# Patient Record
Sex: FEMALE | Race: BLACK OR AFRICAN AMERICAN | NOT HISPANIC OR LATINO | Employment: OTHER | URBAN - METROPOLITAN AREA
[De-identification: names, ages, dates, MRNs, and addresses within clinical notes are randomized per-mention and may not be internally consistent; named-entity substitution may affect disease eponyms.]

---

## 2017-03-19 RX ORDER — DIGOXIN 125 UG/1
TABLET ORAL
Qty: 90 TABLET | Refills: 0 | Status: SHIPPED | OUTPATIENT
Start: 2017-03-19 | End: 2017-07-03 | Stop reason: SDUPTHER

## 2017-04-17 ENCOUNTER — OFFICE VISIT (OUTPATIENT)
Dept: FAMILY MEDICINE | Facility: CLINIC | Age: 82
End: 2017-04-17
Payer: MEDICARE

## 2017-04-17 ENCOUNTER — LAB VISIT (OUTPATIENT)
Dept: LAB | Facility: HOSPITAL | Age: 82
End: 2017-04-17
Attending: INTERNAL MEDICINE
Payer: MEDICARE

## 2017-04-17 VITALS
HEART RATE: 96 BPM | WEIGHT: 109.81 LBS | DIASTOLIC BLOOD PRESSURE: 60 MMHG | BODY MASS INDEX: 22.14 KG/M2 | SYSTOLIC BLOOD PRESSURE: 112 MMHG | HEIGHT: 59 IN | OXYGEN SATURATION: 98 % | RESPIRATION RATE: 18 BRPM | TEMPERATURE: 98 F

## 2017-04-17 DIAGNOSIS — I50.32 CHRONIC DIASTOLIC (CONGESTIVE) HEART FAILURE: Chronic | ICD-10-CM

## 2017-04-17 DIAGNOSIS — I49.5 SINOATRIAL NODE DYSFUNCTION: ICD-10-CM

## 2017-04-17 DIAGNOSIS — Z00.00 ANNUAL PHYSICAL EXAM: Primary | ICD-10-CM

## 2017-04-17 DIAGNOSIS — K21.9 GASTROESOPHAGEAL REFLUX DISEASE, ESOPHAGITIS PRESENCE NOT SPECIFIED: ICD-10-CM

## 2017-04-17 DIAGNOSIS — Z00.00 ANNUAL PHYSICAL EXAM: ICD-10-CM

## 2017-04-17 LAB
ALBUMIN SERPL BCP-MCNC: 3.3 G/DL
ALP SERPL-CCNC: 47 U/L
ALT SERPL W/O P-5'-P-CCNC: 5 U/L
ANION GAP SERPL CALC-SCNC: 6 MMOL/L
AST SERPL-CCNC: 16 U/L
BASOPHILS # BLD AUTO: 0.05 K/UL
BASOPHILS NFR BLD: 0.8 %
BILIRUB SERPL-MCNC: 0.2 MG/DL
BUN SERPL-MCNC: 39 MG/DL
CALCIUM SERPL-MCNC: 9.6 MG/DL
CHLORIDE SERPL-SCNC: 105 MMOL/L
CHOLEST/HDLC SERPL: 3.8 {RATIO}
CO2 SERPL-SCNC: 29 MMOL/L
CREAT SERPL-MCNC: 1.6 MG/DL
DIFFERENTIAL METHOD: ABNORMAL
EOSINOPHIL # BLD AUTO: 0.3 K/UL
EOSINOPHIL NFR BLD: 4.9 %
ERYTHROCYTE [DISTWIDTH] IN BLOOD BY AUTOMATED COUNT: 15.6 %
EST. GFR  (AFRICAN AMERICAN): 30 ML/MIN/1.73 M^2
EST. GFR  (NON AFRICAN AMERICAN): 26 ML/MIN/1.73 M^2
GLUCOSE SERPL-MCNC: 79 MG/DL
HCT VFR BLD AUTO: 35 %
HDL/CHOLESTEROL RATIO: 26.4 %
HDLC SERPL-MCNC: 148 MG/DL
HDLC SERPL-MCNC: 39 MG/DL
HGB BLD-MCNC: 10.7 G/DL
LDLC SERPL CALC-MCNC: 84.4 MG/DL
LYMPHOCYTES # BLD AUTO: 1.7 K/UL
LYMPHOCYTES NFR BLD: 26.5 %
MCH RBC QN AUTO: 25.7 PG
MCHC RBC AUTO-ENTMCNC: 30.6 %
MCV RBC AUTO: 84 FL
MONOCYTES # BLD AUTO: 0.7 K/UL
MONOCYTES NFR BLD: 10.1 %
NEUTROPHILS # BLD AUTO: 3.7 K/UL
NEUTROPHILS NFR BLD: 57.2 %
NONHDLC SERPL-MCNC: 109 MG/DL
PLATELET # BLD AUTO: 233 K/UL
PMV BLD AUTO: 9.7 FL
POTASSIUM SERPL-SCNC: 5.8 MMOL/L
PROT SERPL-MCNC: 7.5 G/DL
RBC # BLD AUTO: 4.17 M/UL
SODIUM SERPL-SCNC: 140 MMOL/L
TRIGL SERPL-MCNC: 123 MG/DL
TSH SERPL DL<=0.005 MIU/L-ACNC: 2.96 UIU/ML
WBC # BLD AUTO: 6.53 K/UL

## 2017-04-17 PROCEDURE — 85025 COMPLETE CBC W/AUTO DIFF WBC: CPT

## 2017-04-17 PROCEDURE — 99999 PR PBB SHADOW E&M-EST. PATIENT-LVL III: CPT | Mod: PBBFAC,,, | Performed by: INTERNAL MEDICINE

## 2017-04-17 PROCEDURE — 84443 ASSAY THYROID STIM HORMONE: CPT

## 2017-04-17 PROCEDURE — 80053 COMPREHEN METABOLIC PANEL: CPT

## 2017-04-17 PROCEDURE — 99499 UNLISTED E&M SERVICE: CPT | Mod: S$GLB,,, | Performed by: INTERNAL MEDICINE

## 2017-04-17 PROCEDURE — 99397 PER PM REEVAL EST PAT 65+ YR: CPT | Mod: S$GLB,,, | Performed by: INTERNAL MEDICINE

## 2017-04-17 PROCEDURE — 80061 LIPID PANEL: CPT

## 2017-04-17 PROCEDURE — 36415 COLL VENOUS BLD VENIPUNCTURE: CPT

## 2017-04-17 RX ORDER — PANTOPRAZOLE SODIUM 40 MG/1
40 TABLET, DELAYED RELEASE ORAL DAILY
Qty: 90 TABLET | Refills: 1 | Status: SHIPPED | OUTPATIENT
Start: 2017-04-17 | End: 2017-09-21 | Stop reason: SDUPTHER

## 2017-04-17 NOTE — PROGRESS NOTES
SUBJECTIVE     No chief complaint on file.      HPI  Tish Rueda is a 89 y.o. female with multiple medical diagnoses as listed in the medical history and problem list that presents for annual exam. Pt says she has been feeling fine. She was brought in by her daughter today just for a routine check up. Per pt's daughter, she has a good appetite but eats very small portions. She has lots of Ensure meal supplements. Pt denies any difficulty sleeping. Per pt's daughter, she still lives alone. She ambulates with a rolling walker. She does not fall frequently. Pt takes her meds daily and takes a daily MVI.     PAST MEDICAL HISTORY:  Past Medical History:   Diagnosis Date    CHF (congestive heart failure)     Dr. Rivers    Diverticulosis     GERD (gastroesophageal reflux disease)     Hypertension     Pacemaker     Peptic ulcer disease        PAST SURGICAL HISTORY:  Past Surgical History:   Procedure Laterality Date    APPENDECTOMY      CARDIAC PACEMAKER PLACEMENT      CHOLECYSTECTOMY      EYE SURGERY      cataract    HYSTERECTOMY      salpingoopherectomy         SOCIAL HISTORY:  Social History     Social History    Marital status:      Spouse name: N/A    Number of children: N/A    Years of education: N/A     Occupational History    Not on file.     Social History Main Topics    Smoking status: Never Smoker    Smokeless tobacco: Not on file    Alcohol use No    Drug use: No    Sexual activity: No     Other Topics Concern    Not on file     Social History Narrative       FAMILY HISTORY:  History reviewed. No pertinent family history.    ALLERGIES AND MEDICATIONS: updated and reviewed.  Review of patient's allergies indicates:  No Known Allergies  Current Outpatient Prescriptions   Medication Sig Dispense Refill    acyclovir 5% (ZOVIRAX) 5 % ointment Apply topically every 3 (three) hours. 30 g 1    DIGOX 125 mcg tablet TAKE 1 TABLET BY MOUTH EVERY DAY 90 tablet 0    meclizine (ANTIVERT)  "12.5 mg tablet Take 1 tablet (12.5 mg total) by mouth 2 (two) times daily as needed. 30 tablet 5    pantoprazole (PROTONIX) 40 MG tablet Take 1 tablet (40 mg total) by mouth once daily. 90 tablet 1    pediatric multivitamin chewable tablet Take 1 tablet by mouth once daily.       No current facility-administered medications for this visit.        ROS  Review of Systems   Constitutional: Negative for chills and fever.   HENT: Negative for hearing loss and sore throat.    Eyes: Negative for visual disturbance.   Respiratory: Negative for cough and shortness of breath.    Cardiovascular: Negative for chest pain, palpitations and leg swelling.   Gastrointestinal: Negative for abdominal pain, constipation, diarrhea, nausea and vomiting.   Genitourinary: Negative for dysuria, frequency and urgency.   Musculoskeletal: Negative for arthralgias, joint swelling and myalgias.   Skin: Negative for rash and wound.   Neurological: Positive for headaches.   Psychiatric/Behavioral: Negative for agitation and confusion. The patient is not nervous/anxious.          OBJECTIVE     Physical Exam  Vitals:    04/17/17 0957   BP: 112/60   Pulse: 96   Resp: 18   Temp: 98.3 °F (36.8 °C)    Body mass index is 22.17 kg/(m^2).  Weight: 49.8 kg (109 lb 12.6 oz)   Height: 4' 11" (149.9 cm)     Physical Exam   Constitutional: She is oriented to person, place, and time. She appears well-developed and well-nourished. No distress.   HENT:   Head: Normocephalic and atraumatic.   Right Ear: External ear normal.   Left Ear: External ear normal.   Nose: Nose normal.   Mouth/Throat: Oropharynx is clear and moist.   Eyes: Conjunctivae and EOM are normal. Right eye exhibits no discharge. Left eye exhibits no discharge. No scleral icterus.   Neck: Normal range of motion. Neck supple. No JVD present. No tracheal deviation present.   Cardiovascular: Normal rate, regular rhythm, normal heart sounds and intact distal pulses.  Exam reveals no gallop and no " friction rub.    No murmur heard.  Pulmonary/Chest: Effort normal and breath sounds normal. No respiratory distress. She has no wheezes.   Abdominal: Soft. Bowel sounds are normal. She exhibits no distension and no mass. There is no tenderness. There is no rebound and no guarding.   Musculoskeletal: Normal range of motion. She exhibits no edema, tenderness or deformity.   Neurological: She is alert and oriented to person, place, and time. She exhibits normal muscle tone. Coordination normal.   Skin: Skin is warm and dry. No rash noted. No erythema.   Psychiatric: She has a normal mood and affect. Her behavior is normal. Judgment and thought content normal.         Health Maintenance       Date Due Completion Date    TETANUS VACCINE 7/7/1931 ---    DEXA SCAN 7/7/1953 ---    Zoster Vaccine 7/7/1973 ---    Pneumococcal (65+) (1 of 2 - PCV13) 7/7/1978 ---    Influenza Vaccine 8/1/2016 ---    Lipid Panel 3/24/2021 3/24/2016            ASSESSMENT     89 y.o. female with     1. Annual physical exam    2. Sinoatrial node dysfunction    3. Chronic diastolic (congestive) heart failure    4. Gastroesophageal reflux disease, esophagitis presence not specified        PLAN:     1. Annual physical exam  - Counseled on age appropriate medical preventative services, including age appropriate cancer screenings, over all nutritional health, need for a consistent exercise regimen and an over all push towards maintaining a vigorous and active lifestyle.  Counseled on age appropriate vaccines and discussed upcoming health care needs based on age/gender.  Spent time with patient counseling on need for a good patient/doctor relationship moving forward.  Discussed use of common OTC medications and supplements.  Discussed common dietary aids and use of caffeine and the need for good sleep hygiene and stress management.  - CBC auto differential; Future  - Comprehensive metabolic panel; Future  - Lipid panel; Future  - TSH; Future    2.  Sinoatrial node dysfunction  - Stable; no acute issues  - Pacemaker in place  - Ambulatory referral to Cardiology    3. Chronic diastolic (congestive) heart failure  - Stable; no acute issues  - Continue good BP control  - Ambulatory referral to Cardiology    4. Gastroesophageal reflux disease, esophagitis presence not specified  - Stable; no acute issues  - The current medical regimen is effective;  continue present plan and medications.  - pantoprazole (PROTONIX) 40 MG tablet; Take 1 tablet (40 mg total) by mouth once daily.  Dispense: 90 tablet; Refill: 1      RTC in 3 months for f/u with PCP-Dr. Johann Flores MD  04/17/2017 10:07 AM        No Follow-up on file.            +

## 2017-05-01 ENCOUNTER — OFFICE VISIT (OUTPATIENT)
Dept: CARDIOLOGY | Facility: CLINIC | Age: 82
End: 2017-05-01
Payer: MEDICARE

## 2017-05-01 ENCOUNTER — TELEPHONE (OUTPATIENT)
Dept: FAMILY MEDICINE | Facility: CLINIC | Age: 82
End: 2017-05-01

## 2017-05-01 VITALS
SYSTOLIC BLOOD PRESSURE: 129 MMHG | HEART RATE: 69 BPM | DIASTOLIC BLOOD PRESSURE: 60 MMHG | HEIGHT: 59 IN | BODY MASS INDEX: 21.17 KG/M2 | WEIGHT: 105 LBS | OXYGEN SATURATION: 96 %

## 2017-05-01 DIAGNOSIS — Z95.0 PACEMAKER: ICD-10-CM

## 2017-05-01 DIAGNOSIS — I50.32 CHRONIC DIASTOLIC (CONGESTIVE) HEART FAILURE: ICD-10-CM

## 2017-05-01 DIAGNOSIS — N18.30 CHRONIC KIDNEY DISEASE (CKD), STAGE III (MODERATE): ICD-10-CM

## 2017-05-01 DIAGNOSIS — I49.5 SINOATRIAL NODE DYSFUNCTION: Primary | ICD-10-CM

## 2017-05-01 DIAGNOSIS — I10 ESSENTIAL HYPERTENSION, BENIGN: ICD-10-CM

## 2017-05-01 DIAGNOSIS — D63.8 ANEMIA OF CHRONIC DISORDER: ICD-10-CM

## 2017-05-01 PROCEDURE — 99499 UNLISTED E&M SERVICE: CPT | Mod: S$GLB,,, | Performed by: INTERNAL MEDICINE

## 2017-05-01 PROCEDURE — 99204 OFFICE O/P NEW MOD 45 MIN: CPT | Mod: S$GLB,,, | Performed by: INTERNAL MEDICINE

## 2017-05-01 PROCEDURE — 1159F MED LIST DOCD IN RCRD: CPT | Mod: S$GLB,,, | Performed by: INTERNAL MEDICINE

## 2017-05-01 PROCEDURE — 99999 PR PBB SHADOW E&M-EST. PATIENT-LVL III: CPT | Mod: PBBFAC,,, | Performed by: INTERNAL MEDICINE

## 2017-05-01 PROCEDURE — 1160F RVW MEDS BY RX/DR IN RCRD: CPT | Mod: S$GLB,,, | Performed by: INTERNAL MEDICINE

## 2017-05-01 PROCEDURE — 1126F AMNT PAIN NOTED NONE PRSNT: CPT | Mod: S$GLB,,, | Performed by: INTERNAL MEDICINE

## 2017-05-01 NOTE — TELEPHONE ENCOUNTER
----- Message from Sendy Paz sent at 5/1/2017  8:35 AM CDT -----  Contact: daughter-molly Jim is requesting a script for a dry cough. Please advise. [phone]

## 2017-05-01 NOTE — PROGRESS NOTES
Subjective:    Patient ID:  Tish Rueda is a 89 y.o. female who presents for evaluation of Establish Care      HPI  Patient is here to establish care for sinus node dysfunction previous pacemaker placement.  She previously was followed by [facility] and the heart clinic.  She's changing due to insurance purposes.  She has previous history of diastolic dysfunction and sinus node dysfunction with previous pacemaker placement.  She had her pacemaker changed in 2014.  She denies any cardiopulmonary complaints.  She somewhat of a poor historian.  She's not expressing any PND, orthopnea or lower edema.  She is wearing compression stockings.  She denies any dizziness to the point of presyncope or syncope.  She does go out of the house several times a week.  Mainly she goes shopping.  She does do some light laundry at the house.    Review of Systems   Constitution: Negative.   HENT: Negative.    Eyes: Negative.    Cardiovascular: Negative for chest pain, dyspnea on exertion, irregular heartbeat, leg swelling, near-syncope, orthopnea, palpitations, paroxysmal nocturnal dyspnea and syncope.   Respiratory: Negative for shortness of breath.    Skin: Negative.    Musculoskeletal: Negative.    Gastrointestinal: Negative for abdominal pain, constipation and diarrhea.   Genitourinary: Negative for dysuria.   Neurological: Negative.    Psychiatric/Behavioral: Negative.      Past Medical History:   Diagnosis Date    CHF (congestive heart failure)     Dr. Rivers    Diverticulosis     GERD (gastroesophageal reflux disease)     Hypertension     Pacemaker     Peptic ulcer disease      Past Surgical History:   Procedure Laterality Date    APPENDECTOMY      CARDIAC PACEMAKER PLACEMENT      CHOLECYSTECTOMY      EYE SURGERY      cataract    HYSTERECTOMY      salpingoopherectomy       Social History   Substance Use Topics    Smoking status: Never Smoker    Smokeless tobacco: None    Alcohol use No   History reviewed. No  pertinent family history.     Objective:    Physical Exam   Constitutional: She is oriented to person, place, and time. She appears well-developed and well-nourished.   HENT:   Head: Normocephalic and atraumatic.   Eyes: Conjunctivae and EOM are normal. Pupils are equal, round, and reactive to light.   Neck: Normal range of motion. Neck supple. No thyromegaly present.   Cardiovascular: Normal rate and regular rhythm.    No murmur heard.  Pulmonary/Chest: Effort normal and breath sounds normal. No respiratory distress.   Abdominal: Soft. Bowel sounds are normal.   Musculoskeletal: She exhibits no edema.   Neurological: She is alert and oriented to person, place, and time.   Skin: Skin is warm and dry.   Psychiatric: She has a normal mood and affect. Her behavior is normal.       ekg AV paced rhythm    Assessment:       1. Sinoatrial node dysfunction    2. Pacemaker    3. Essential hypertension, benign    4. Chronic kidney disease (CKD), stage III (moderate)    5. Anemia of chronic disorder    6. Chronic diastolic (congestive) heart failure         Plan:       -Continue current therapy  -Check baseline echocardiogram  -Routine surveillance Metronic checks    Return to clinic at next Medtronic check

## 2017-05-01 NOTE — MR AVS SNAPSHOT
[facility]  [address] [place] [address]  Phone: [phone]                  Tish Rueda   2017 10:00 AM   Office Visit    Description:  Female : 1913   Provider:  Andres Ray MD   Department:  [facility]           Reason for Visit     Establish Care           Diagnoses this Visit        Comments    Sinoatrial node dysfunction    -  Primary     Pacemaker         Essential hypertension, benign         Chronic kidney disease (CKD), stage III (moderate)         Anemia of chronic disorder         Chronic diastolic (congestive) heart failure                To Do List           Future Appointments        Provider Department Dept Phone    2017 10:00 AM Andres Ray MD [facility] [phone]      Goals (5 Years of Data)     None      [facility]     [facility]  Assistance  Unless otherwise directed by your provider, please contact [facility], our nurse care line that is available for  assistance.     Registered nurses in the [facility] provide: appointment scheduling, clinical advisement, health education, and other advisory services.  Call: [phone] (toll free)               Medications           Message regarding Medications     Verify the changes and/or additions to your medication regime listed below are the same as discussed with your clinician today.  If any of these changes or additions are incorrect, please notify your healthcare provider.        STOP taking these medications     acyclovir 5% (ZOVIRAX) 5 % ointment Apply topically every 3 (three) hours.           Verify that the below list of medications is an accurate representation of the medications you are currently taking.  If none reported, the list may be blank. If incorrect, please contact your healthcare provider. Carry this list with you in case of emergency.           Current Medications     DIGOX 125 mcg tablet  "TAKE 1 TABLET BY MOUTH EVERY DAY    meclizine (ANTIVERT) 12.5 mg tablet Take 1 tablet (12.5 mg total) by mouth 2 (two) times daily as needed.    pantoprazole (PROTONIX) 40 MG tablet Take 1 tablet (40 mg total) by mouth once daily.    pediatric multivitamin chewable tablet Take 1 tablet by mouth once daily.           Clinical Reference Information           Your Vitals Were     BP Pulse Height Weight SpO2 BMI    129/60 (BP Location: Left arm, Patient Position: Sitting, BP Method: Automatic) 69 4' 11" (1.499 m) 47.6 kg (105 lb) 96% 21.21 kg/m2      Blood Pressure          Most Recent Value    BP  129/60      Allergies as of 5/1/2017     No Known Allergies      Immunizations Administered on Date of Encounter - 5/1/2017     None      [facility] Sign-Up     Activating your [facility] account is as easy as 1-2-3!     1) Visit [email or web address], select Sign Up Now, enter this activation code and your date of birth, then select Next.  [email or web address]  Expires: 6/15/2017  9:37 AM      2) Create a username and password to use when you visit [facility] in the future and select a security question in case you lose your password and select Next.    3) Enter your e-mail address and click Sign Up!    Additional Information  If you have questions, please e-mail [email or web address] or call [phone] to talk to our [facility] staff. Remember, [facility] is NOT to be used for urgent needs. For medical emergencies, dial 911.         Language Assistance Services     ATTENTION: Language assistance services are available, free of charge. Please call [phone].      ATENCIÓN: Si habla español, tiene a yoder disposición servicios gratuitos de asistencia lingüística. Llame al [phone].     CHÚ Ý: N?u b?n nói Ti?ng Vi?t, có các d?ch v? h? tr? ngôn ng? mi?n phí dành cho b?n. G?i s? [phone].         [facility] complies with applicable Federal civil rights laws and does not discriminate on the basis of race, color, " national origin, age, disability, or sex.

## 2017-05-15 ENCOUNTER — HOSPITAL ENCOUNTER (OUTPATIENT)
Dept: CARDIOLOGY | Facility: HOSPITAL | Age: 82
Discharge: HOME | End: 2017-05-15
Attending: INTERNAL MEDICINE
Payer: MEDICARE

## 2017-05-15 DIAGNOSIS — I49.5 SINOATRIAL NODE DYSFUNCTION: ICD-10-CM

## 2017-05-15 LAB
AORTIC VALVE REGURGITATION: NORMAL
DIASTOLIC DYSFUNCTION: NO
ESTIMATED PA SYSTOLIC PRESSURE: 27.6
MITRAL VALVE REGURGITATION: NORMAL
RETIRED EF AND QEF - SEE NOTES: 55 (ref 55–65)
TRICUSPID VALVE REGURGITATION: NORMAL

## 2017-05-15 PROCEDURE — 93306 TTE W/DOPPLER COMPLETE: CPT

## 2017-05-15 PROCEDURE — 93306 TTE W/DOPPLER COMPLETE: CPT | Mod: 26,,, | Performed by: INTERNAL MEDICINE

## 2017-07-03 RX ORDER — DIGOXIN 125 UG/1
TABLET ORAL
Qty: 90 TABLET | Refills: 1 | Status: SHIPPED | OUTPATIENT
Start: 2017-07-03 | End: 2018-01-22 | Stop reason: SDUPTHER

## 2017-07-11 ENCOUNTER — OFFICE VISIT (OUTPATIENT)
Dept: CARDIOLOGY | Facility: CLINIC | Age: 82
End: 2017-07-11
Payer: MEDICARE

## 2017-07-11 VITALS
HEART RATE: 77 BPM | WEIGHT: 105 LBS | SYSTOLIC BLOOD PRESSURE: 103 MMHG | HEIGHT: 59 IN | DIASTOLIC BLOOD PRESSURE: 55 MMHG | BODY MASS INDEX: 21.17 KG/M2 | OXYGEN SATURATION: 97 %

## 2017-07-11 DIAGNOSIS — N18.30 CHRONIC KIDNEY DISEASE (CKD), STAGE III (MODERATE): ICD-10-CM

## 2017-07-11 DIAGNOSIS — Z95.0 PACEMAKER: ICD-10-CM

## 2017-07-11 DIAGNOSIS — I50.32 CHRONIC DIASTOLIC (CONGESTIVE) HEART FAILURE: ICD-10-CM

## 2017-07-11 DIAGNOSIS — I49.5 SINOATRIAL NODE DYSFUNCTION: Primary | ICD-10-CM

## 2017-07-11 DIAGNOSIS — I10 ESSENTIAL HYPERTENSION, BENIGN: ICD-10-CM

## 2017-07-11 DIAGNOSIS — D63.8 ANEMIA OF CHRONIC DISORDER: ICD-10-CM

## 2017-07-11 PROCEDURE — 1125F AMNT PAIN NOTED PAIN PRSNT: CPT | Mod: S$GLB,,, | Performed by: INTERNAL MEDICINE

## 2017-07-11 PROCEDURE — 99999 PR PBB SHADOW E&M-EST. PATIENT-LVL III: CPT | Mod: PBBFAC,,, | Performed by: INTERNAL MEDICINE

## 2017-07-11 PROCEDURE — 93288 INTERROG EVL PM/LDLS PM IP: CPT | Mod: S$GLB,,, | Performed by: INTERNAL MEDICINE

## 2017-07-11 PROCEDURE — 99499 UNLISTED E&M SERVICE: CPT | Mod: S$GLB,,, | Performed by: INTERNAL MEDICINE

## 2017-07-11 PROCEDURE — 1159F MED LIST DOCD IN RCRD: CPT | Mod: S$GLB,,, | Performed by: INTERNAL MEDICINE

## 2017-07-11 PROCEDURE — 99214 OFFICE O/P EST MOD 30 MIN: CPT | Mod: S$GLB,,, | Performed by: INTERNAL MEDICINE

## 2017-07-11 NOTE — PROGRESS NOTES
Subjective:    Patient ID:  Tish Rueda is a 89 y.o. female who presents for evaluation of No chief complaint on file.      HPI   previous history:  Patient is here to establish care for sinus node dysfunction previous pacemaker placement.  She previously was followed by [facility] and the heart clinic.  She's changing due to insurance purposes.  She has previous history of diastolic dysfunction and sinus node dysfunction with previous pacemaker placement.  She had her pacemaker changed in 2014.  She denies any cardiopulmonary complaints.  She somewhat of a poor historian.  She's not expressing any PND, orthopnea or lower edema.  She is wearing compression stockings.  She denies any dizziness to the point of presyncope or syncope.  She does go out of the house several times a week.  Mainly she goes shopping.  She does do some light laundry at the house.    Today:  Here for follow-up of sinus node dysfunction and pacemaker placement.  She denies any worsening cardiopulmonary complaints.  She does get occasional lower extremity swelling but relieved with elevation.  She doesn't have any compression stockings.  She denies any chest pain, shortness breath or palpitations.  She is limited in her activity.  She does use a seated walker.  She denies any PND or orthopnea.  She does have some dizziness occasionally when getting up too fast but not to the point of presyncope or syncope.        Review of Systems   Constitution: Negative.   HENT: Negative.    Eyes: Negative.    Cardiovascular: Positive for leg swelling. Negative for chest pain, dyspnea on exertion, irregular heartbeat, near-syncope, orthopnea, palpitations, paroxysmal nocturnal dyspnea and syncope.   Respiratory: Negative for shortness of breath.    Skin: Negative.    Musculoskeletal: Negative.    Gastrointestinal: Negative for abdominal pain, constipation and diarrhea.   Genitourinary: Negative for dysuria.   Neurological: Negative.    Psychiatric/Behavioral:  Negative.         Objective:    Physical Exam   Constitutional: She is oriented to person, place, and time. She appears well-developed and well-nourished.   HENT:   Head: Normocephalic and atraumatic.   Eyes: Conjunctivae and EOM are normal. Pupils are equal, round, and reactive to light.   Neck: Normal range of motion. Neck supple. No thyromegaly present.   Cardiovascular: Normal rate and regular rhythm.    No murmur heard.  Pulmonary/Chest: Effort normal and breath sounds normal. No respiratory distress.   Abdominal: Soft. Bowel sounds are normal.   Musculoskeletal: She exhibits no edema.   Neurological: She is alert and oriented to person, place, and time.   Skin: Skin is warm and dry.   Psychiatric: She has a normal mood and affect. Her behavior is normal.        Echo:  CONCLUSIONS     1 - Normal left ventricular systolic function (EF 55-60%).     2 - Concentric hypertrophy.     3 - Left atrial enlargement.     4 - Mild aortic regurgitation.     5 - Trivial mitral regurgitation.     6 - Mild tricuspid regurgitation.     7 - Trivial pulmonic regurgitation.     Assessment:       1. Sinoatrial node dysfunction    2. Pacemaker    3. Essential hypertension, benign    4. Chronic kidney disease (CKD), stage III (moderate)    5. Anemia of chronic disorder    6. Chronic diastolic (congestive) heart failure         Plan:       -Continue current therapy  -Routine surveillance Metronic checks  -Compression stockings for swelling    Return to clinic in 6 mos          Medtronic pacemaker check:    Indication sinus node dysfunction    Advise a DR  Mode AAIR/DDDR lower rate of 60  Underlying rhythm is sinus bradycardia    A paced 77%, ventricular paced 29%    A: 1 mV, 3 of 42 ohms, 1.875 V at 0.4 ms  RV 6.4 mV, 309 9 ohms, 1 V at 0.4 ms    1 ventricular high rate event of proximally a beats which was asymptomatic  No changes    Return to clinic in 6 months

## 2017-09-21 ENCOUNTER — OFFICE VISIT (OUTPATIENT)
Dept: FAMILY MEDICINE | Facility: CLINIC | Age: 82
End: 2017-09-21
Payer: MEDICARE

## 2017-09-21 VITALS
HEART RATE: 68 BPM | SYSTOLIC BLOOD PRESSURE: 116 MMHG | BODY MASS INDEX: 22.66 KG/M2 | OXYGEN SATURATION: 99 % | DIASTOLIC BLOOD PRESSURE: 70 MMHG | TEMPERATURE: 98 F | WEIGHT: 112.19 LBS

## 2017-09-21 DIAGNOSIS — I50.32 CHRONIC DIASTOLIC HF (HEART FAILURE): ICD-10-CM

## 2017-09-21 DIAGNOSIS — K21.9 GASTROESOPHAGEAL REFLUX DISEASE, ESOPHAGITIS PRESENCE NOT SPECIFIED: ICD-10-CM

## 2017-09-21 DIAGNOSIS — M00.9 PYOGENIC ARTHRITIS OF RIGHT KNEE JOINT, DUE TO UNSPECIFIED ORGANISM: Primary | ICD-10-CM

## 2017-09-21 DIAGNOSIS — Z87.898 HISTORY OF DIZZINESS: ICD-10-CM

## 2017-09-21 PROCEDURE — 3008F BODY MASS INDEX DOCD: CPT | Mod: S$GLB,,, | Performed by: FAMILY MEDICINE

## 2017-09-21 PROCEDURE — 99214 OFFICE O/P EST MOD 30 MIN: CPT | Mod: 25,S$GLB,, | Performed by: FAMILY MEDICINE

## 2017-09-21 PROCEDURE — 1125F AMNT PAIN NOTED PAIN PRSNT: CPT | Mod: S$GLB,,, | Performed by: FAMILY MEDICINE

## 2017-09-21 PROCEDURE — 1159F MED LIST DOCD IN RCRD: CPT | Mod: S$GLB,,, | Performed by: FAMILY MEDICINE

## 2017-09-21 PROCEDURE — 96372 THER/PROPH/DIAG INJ SC/IM: CPT | Mod: S$GLB,,, | Performed by: FAMILY MEDICINE

## 2017-09-21 PROCEDURE — 99499 UNLISTED E&M SERVICE: CPT | Mod: S$GLB,,, | Performed by: FAMILY MEDICINE

## 2017-09-21 PROCEDURE — 99999 PR PBB SHADOW E&M-EST. PATIENT-LVL III: CPT | Mod: PBBFAC,,, | Performed by: FAMILY MEDICINE

## 2017-09-21 RX ORDER — PANTOPRAZOLE SODIUM 40 MG/1
40 TABLET, DELAYED RELEASE ORAL DAILY
Qty: 90 TABLET | Refills: 1 | Status: SHIPPED | OUTPATIENT
Start: 2017-09-21 | End: 2018-01-01 | Stop reason: SDUPTHER

## 2017-09-21 RX ORDER — CLINDAMYCIN HYDROCHLORIDE 300 MG/1
300 CAPSULE ORAL 3 TIMES DAILY
Qty: 21 CAPSULE | Refills: 0 | Status: SHIPPED | OUTPATIENT
Start: 2017-09-21 | End: 2017-09-28

## 2017-09-21 RX ORDER — MECLIZINE HCL 12.5 MG 12.5 MG/1
12.5 TABLET ORAL 2 TIMES DAILY PRN
Qty: 30 TABLET | Refills: 5 | Status: SHIPPED | OUTPATIENT
Start: 2017-09-21

## 2017-09-21 RX ORDER — CEFTRIAXONE 500 MG/1
500 INJECTION, POWDER, FOR SOLUTION INTRAMUSCULAR; INTRAVENOUS
Status: COMPLETED | OUTPATIENT
Start: 2017-09-21 | End: 2017-09-21

## 2017-09-21 RX ADMIN — CEFTRIAXONE 500 MG: 500 INJECTION, POWDER, FOR SOLUTION INTRAMUSCULAR; INTRAVENOUS at 10:09

## 2017-09-21 NOTE — PROGRESS NOTES
Subjective:       Patient ID: Tish Rueda is a 89 y.o. female.    Chief Complaint: Right Knee Pain and Right Side Pain    Patient presents for right knee pain. She has redness and swelling. She has no trauma to the knee.  She denies any known insect bites. She has no swelling.       Review of Systems   Constitutional: Negative for chills, diaphoresis and fever.   Skin: Positive for color change. Negative for rash.       Objective:       Vitals:    09/21/17 0958   BP: 116/70   Pulse: 68   Temp: 98 °F (36.7 °C)   TempSrc: Oral   SpO2: 99%   Weight: 50.9 kg (112 lb 3.4 oz)       Physical Exam   Constitutional: She is oriented to person, place, and time. She appears well-developed and well-nourished.   Cardiovascular: Normal rate, regular rhythm and normal heart sounds.  Exam reveals no gallop and no friction rub.    No murmur heard.  Musculoskeletal: She exhibits no edema.        Right knee: She exhibits erythema. She exhibits normal range of motion and no swelling.        Legs:  Neurological: She is alert and oriented to person, place, and time.   Skin: She is not diaphoretic.       Assessment:       1. Pyogenic arthritis of right knee joint, due to unspecified organism    2. History of dizziness    3. Gastroesophageal reflux disease, esophagitis presence not specified    4. Chronic diastolic HF (heart failure)        Plan:       Tish was seen today for right knee pain and right side pain.    Diagnoses and all orders for this visit:    Pyogenic arthritis of right knee joint, due to unspecified organism  -     clindamycin (CLEOCIN) 300 MG capsule; Take 1 capsule (300 mg total) by mouth 3 (three) times daily.  -     CBC auto differential; Future  -     Sedimentation rate, manual; Future  -     C-reactive protein; Future  -     WHEELCHAIR FOR HOME USE  -     cefTRIAXone injection 500 mg; Inject 0.5 g (500 mg total) into the muscle one time.  Starting her on clindamycin and given a rocephin 500mg Im.     History  of dizziness  -     meclizine (ANTIVERT) 12.5 mg tablet; Take 1 tablet (12.5 mg total) by mouth 2 (two) times daily as needed.  Stable. Refilled meds.     Gastroesophageal reflux disease, esophagitis presence not specified  -     pantoprazole (PROTONIX) 40 MG tablet; Take 1 tablet (40 mg total) by mouth once daily.  Stable. Refilled meds.     Chronic diastolic HF (heart failure)  -     COMPREHENSIVE METABOLIC PANEL; Future  -     CBC auto differential; Future

## 2017-09-21 NOTE — PROGRESS NOTES
Pt was given Rocephin 500 mg IM in her right gluteus joshua. Tolerated well. Instructed to remain in the clinic for 15 mins after admin for observation.

## 2017-09-26 ENCOUNTER — TELEPHONE (OUTPATIENT)
Dept: FAMILY MEDICINE | Facility: CLINIC | Age: 82
End: 2017-09-26

## 2017-09-26 NOTE — TELEPHONE ENCOUNTER
Spoke to [facility], states that patient has orders for a wheel chair with a gel pad and a lap belt buckle.  They would like to know if patient has any types of ulcers or anything because this might be denied.  If this is not necessary they will need to cancel the order for the gel pad and buckle.  Please call back with a verbal.

## 2017-09-26 NOTE — TELEPHONE ENCOUNTER
Spoke to edin, she need order for standard wheel chair cushion. Order faxed to [facility]

## 2017-09-26 NOTE — TELEPHONE ENCOUNTER
----- Message from Whitley Goodrich sent at 9/26/2017  2:55 PM CDT -----  Contact: [facility] [phone]  [facility] is requesting a call back in regards to the pt Please call at your earliest convenience.  Thanks !

## 2017-09-28 ENCOUNTER — OFFICE VISIT (OUTPATIENT)
Dept: FAMILY MEDICINE | Facility: CLINIC | Age: 82
End: 2017-09-28
Payer: MEDICARE

## 2017-09-28 ENCOUNTER — LAB VISIT (OUTPATIENT)
Dept: LAB | Facility: HOSPITAL | Age: 82
End: 2017-09-28
Attending: FAMILY MEDICINE
Payer: MEDICARE

## 2017-09-28 VITALS
HEIGHT: 59 IN | TEMPERATURE: 98 F | DIASTOLIC BLOOD PRESSURE: 62 MMHG | OXYGEN SATURATION: 99 % | SYSTOLIC BLOOD PRESSURE: 114 MMHG | HEART RATE: 70 BPM

## 2017-09-28 DIAGNOSIS — R60.9 EDEMA, UNSPECIFIED TYPE: Primary | ICD-10-CM

## 2017-09-28 DIAGNOSIS — M00.9 PYOGENIC ARTHRITIS OF RIGHT KNEE JOINT, DUE TO UNSPECIFIED ORGANISM: ICD-10-CM

## 2017-09-28 DIAGNOSIS — I50.32 CHRONIC DIASTOLIC HEART FAILURE: ICD-10-CM

## 2017-09-28 DIAGNOSIS — E46 PROTEIN MALNUTRITION: ICD-10-CM

## 2017-09-28 DIAGNOSIS — I50.32 CHRONIC DIASTOLIC HF (HEART FAILURE): ICD-10-CM

## 2017-09-28 LAB
ALBUMIN SERPL BCP-MCNC: 3.1 G/DL
ALP SERPL-CCNC: 78 U/L
ALT SERPL W/O P-5'-P-CCNC: 12 U/L
ANION GAP SERPL CALC-SCNC: 9 MMOL/L
AST SERPL-CCNC: 22 U/L
BASOPHILS # BLD AUTO: 0.03 K/UL
BASOPHILS NFR BLD: 0.4 %
BILIRUB SERPL-MCNC: 0.6 MG/DL
BUN SERPL-MCNC: 30 MG/DL
CALCIUM SERPL-MCNC: 9.7 MG/DL
CHLORIDE SERPL-SCNC: 108 MMOL/L
CO2 SERPL-SCNC: 22 MMOL/L
CREAT SERPL-MCNC: 1.8 MG/DL
CRP SERPL-MCNC: 45.2 MG/L
DIFFERENTIAL METHOD: ABNORMAL
EOSINOPHIL # BLD AUTO: 0.3 K/UL
EOSINOPHIL NFR BLD: 4.6 %
ERYTHROCYTE [DISTWIDTH] IN BLOOD BY AUTOMATED COUNT: 15.1 %
ERYTHROCYTE [SEDIMENTATION RATE] IN BLOOD BY WESTERGREN METHOD: 86 MM/HR
EST. GFR  (AFRICAN AMERICAN): 26 ML/MIN/1.73 M^2
EST. GFR  (NON AFRICAN AMERICAN): 22 ML/MIN/1.73 M^2
GLUCOSE SERPL-MCNC: 70 MG/DL
HCT VFR BLD AUTO: 34.5 %
HGB BLD-MCNC: 10.8 G/DL
LYMPHOCYTES # BLD AUTO: 1.4 K/UL
LYMPHOCYTES NFR BLD: 20.4 %
MCH RBC QN AUTO: 26.2 PG
MCHC RBC AUTO-ENTMCNC: 31.3 G/DL
MCV RBC AUTO: 84 FL
MONOCYTES # BLD AUTO: 0.7 K/UL
MONOCYTES NFR BLD: 10.9 %
NEUTROPHILS # BLD AUTO: 4.2 K/UL
NEUTROPHILS NFR BLD: 63.1 %
PLATELET # BLD AUTO: 246 K/UL
PMV BLD AUTO: 9.6 FL
POTASSIUM SERPL-SCNC: 6.1 MMOL/L
PROT SERPL-MCNC: 8.1 G/DL
RBC # BLD AUTO: 4.13 M/UL
SODIUM SERPL-SCNC: 139 MMOL/L
WBC # BLD AUTO: 6.68 K/UL

## 2017-09-28 PROCEDURE — 99999 PR PBB SHADOW E&M-EST. PATIENT-LVL III: CPT | Mod: PBBFAC,,, | Performed by: FAMILY MEDICINE

## 2017-09-28 PROCEDURE — 36415 COLL VENOUS BLD VENIPUNCTURE: CPT

## 2017-09-28 PROCEDURE — 99499 UNLISTED E&M SERVICE: CPT | Mod: S$GLB,,, | Performed by: FAMILY MEDICINE

## 2017-09-28 PROCEDURE — 1159F MED LIST DOCD IN RCRD: CPT | Mod: S$GLB,,, | Performed by: FAMILY MEDICINE

## 2017-09-28 PROCEDURE — 85651 RBC SED RATE NONAUTOMATED: CPT

## 2017-09-28 PROCEDURE — 80053 COMPREHEN METABOLIC PANEL: CPT

## 2017-09-28 PROCEDURE — 85025 COMPLETE CBC W/AUTO DIFF WBC: CPT

## 2017-09-28 PROCEDURE — 99214 OFFICE O/P EST MOD 30 MIN: CPT | Mod: S$GLB,,, | Performed by: FAMILY MEDICINE

## 2017-09-28 PROCEDURE — 3008F BODY MASS INDEX DOCD: CPT | Mod: S$GLB,,, | Performed by: FAMILY MEDICINE

## 2017-09-28 PROCEDURE — 86140 C-REACTIVE PROTEIN: CPT

## 2017-09-28 RX ORDER — FUROSEMIDE 20 MG/1
10 TABLET ORAL DAILY PRN
Qty: 15 TABLET | Refills: 2 | Status: SHIPPED | OUTPATIENT
Start: 2017-09-28 | End: 2018-01-01

## 2017-09-28 NOTE — PROGRESS NOTES
Subjective:       Patient ID: Tish Rueda is a 89 y.o. female.    Chief Complaint: Edema and Blisters on Right Foot/ Swelling    Patient presents with swelling of her feet. She has noticed this over the last 2-3 days. She has no cough or shortness of breath. She has CHF, but has not had any other symptoms associated with this like orthopnea. She eats protein, but doesn't eat much. Her albumin is low. She has CKD stage 3 as well. She is being treated for an infection involving her right knee. The redness and swelling have improved in her knee.      Past Medical History:  No date: CHF (congestive heart failure)      Comment: Dr. Rivers  No date: Diverticulosis  No date: GERD (gastroesophageal reflux disease)  No date: Hypertension  No date: Pacemaker  No date: Peptic ulcer disease   Past Surgical History:  No date: APPENDECTOMY  No date: CARDIAC PACEMAKER PLACEMENT  No date: CHOLECYSTECTOMY  No date: EYE SURGERY      Comment: cataract  No date: HYSTERECTOMY  No date: salpingoopherectomy  History reviewed.  No pertinent family history.    Social History    Marital status:              Spouse name:                       Years of education:                 Number of children:               Occupational History    None on file    Social History Main Topics    Smoking status: Never Smoker                                                                Smokeless tobacco: Never Used                        Alcohol use: No              Drug use: No              Sexual activity: No                   Other Topics            Concern    None on file    Social History Narrative    None on file            Edema   Pertinent negatives include no chest pain, chills, coughing, fatigue or fever.     Review of Systems   Constitutional: Negative for chills, fatigue and fever.   Respiratory: Negative for cough, chest tightness, shortness of breath and wheezing.    Cardiovascular: Negative for chest pain and palpitations.      "  Objective:       Vitals:    09/28/17 0922   BP: 114/62   Pulse: 70   Temp: 98.1 °F (36.7 °C)   TempSrc: Oral   SpO2: 99%   Height: 4' 11" (1.499 m)       Physical Exam   Constitutional: She is oriented to person, place, and time. She appears well-developed and well-nourished. No distress.   HENT:   Head: Normocephalic and atraumatic.   Eyes: Conjunctivae are normal.   Neck: Normal range of motion. Neck supple. Carotid bruit is not present.   Cardiovascular: Normal rate, regular rhythm and normal heart sounds.  Exam reveals no gallop and no friction rub.    No murmur heard.  Pulmonary/Chest: Effort normal and breath sounds normal. No respiratory distress. She has no wheezes. She has no rales.   Musculoskeletal: She exhibits edema.   Neurological: She is alert and oriented to person, place, and time.   Skin: She is not diaphoretic.       Assessment:       1. Edema, unspecified type    2. Chronic diastolic heart failure    3. Protein malnutrition        Plan:       Tish was seen today for edema and blisters on right foot/ swelling.    Diagnoses and all orders for this visit:    Edema, unspecified type  -     furosemide (LASIX) 20 MG tablet; Take 0.5 tablets (10 mg total) by mouth daily as needed (swelling).  Lasix prn for leg swelling only.    Chronic diastolic heart failure  She is asymptomatic    Protein malnutrition     increase protein intake to minimize swelling.   "

## 2017-10-04 ENCOUNTER — TELEPHONE (OUTPATIENT)
Dept: FAMILY MEDICINE | Facility: CLINIC | Age: 82
End: 2017-10-04

## 2017-10-04 DIAGNOSIS — E87.5 HYPERKALEMIA: Primary | ICD-10-CM

## 2017-10-04 NOTE — TELEPHONE ENCOUNTER
Spoke to Ms. Jim, instructed to bring patient back to lab this week to recheck since her potassium was high, daughter states she will take pt in Thursday

## 2017-10-05 ENCOUNTER — LAB VISIT (OUTPATIENT)
Dept: LAB | Facility: HOSPITAL | Age: 82
End: 2017-10-05
Attending: FAMILY MEDICINE
Payer: MEDICARE

## 2017-10-05 DIAGNOSIS — E87.5 HYPERKALEMIA: ICD-10-CM

## 2017-10-05 LAB
ANION GAP SERPL CALC-SCNC: 7 MMOL/L
BUN SERPL-MCNC: 35 MG/DL
CALCIUM SERPL-MCNC: 10.1 MG/DL
CHLORIDE SERPL-SCNC: 107 MMOL/L
CO2 SERPL-SCNC: 27 MMOL/L
CREAT SERPL-MCNC: 2 MG/DL
EST. GFR  (AFRICAN AMERICAN): 22 ML/MIN/1.73 M^2
EST. GFR  (NON AFRICAN AMERICAN): 19 ML/MIN/1.73 M^2
GLUCOSE SERPL-MCNC: 73 MG/DL
POTASSIUM SERPL-SCNC: 5.3 MMOL/L
SODIUM SERPL-SCNC: 141 MMOL/L

## 2017-10-05 PROCEDURE — 80048 BASIC METABOLIC PNL TOTAL CA: CPT

## 2017-10-05 PROCEDURE — 36415 COLL VENOUS BLD VENIPUNCTURE: CPT

## 2017-10-06 ENCOUNTER — TELEPHONE (OUTPATIENT)
Dept: FAMILY MEDICINE | Facility: CLINIC | Age: 82
End: 2017-10-06

## 2017-10-06 DIAGNOSIS — N28.9 ACUTE KIDNEY INSUFFICIENCY: Primary | ICD-10-CM

## 2017-10-06 NOTE — TELEPHONE ENCOUNTER
Daughter, Diandra, was inform of worsening kidney function. Stop lasixs unless patient sob then take as needed, recheck labs in 2 weeks.

## 2017-10-19 ENCOUNTER — TELEPHONE (OUTPATIENT)
Dept: FAMILY MEDICINE | Facility: CLINIC | Age: 82
End: 2017-10-19

## 2017-10-19 NOTE — TELEPHONE ENCOUNTER
Daughter , Diandra, called reminded to recheck patient's kldney function, daughter states that she will bring opt back to clinic lab to complete this in next few days

## 2018-01-01 DIAGNOSIS — K21.9 GASTROESOPHAGEAL REFLUX DISEASE, ESOPHAGITIS PRESENCE NOT SPECIFIED: ICD-10-CM

## 2018-01-01 RX ORDER — PANTOPRAZOLE SODIUM 40 MG/1
40 TABLET, DELAYED RELEASE ORAL DAILY
Qty: 90 TABLET | Refills: 0 | Status: SHIPPED | OUTPATIENT
Start: 2018-01-01 | End: 2018-01-01 | Stop reason: SDUPTHER

## 2018-01-01 RX ORDER — DIGOXIN 125 UG/1
TABLET ORAL
Qty: 90 TABLET | Refills: 1 | Status: SHIPPED | OUTPATIENT
Start: 2018-01-01

## 2018-01-01 RX ORDER — PANTOPRAZOLE SODIUM 40 MG/1
40 TABLET, DELAYED RELEASE ORAL DAILY
Qty: 90 TABLET | Refills: 0 | OUTPATIENT
Start: 2018-01-01

## 2018-01-01 RX ORDER — PANTOPRAZOLE SODIUM 40 MG/1
40 TABLET, DELAYED RELEASE ORAL DAILY
Qty: 90 TABLET | Refills: 0 | Status: SHIPPED | OUTPATIENT
Start: 2018-01-01 | End: 2019-01-01 | Stop reason: SDUPTHER

## 2018-01-22 RX ORDER — DIGOXIN 125 UG/1
TABLET ORAL
Qty: 90 TABLET | Refills: 1 | Status: SHIPPED | OUTPATIENT
Start: 2018-01-22 | End: 2018-05-05 | Stop reason: SDUPTHER

## 2018-05-05 RX ORDER — DIGOXIN 125 UG/1
TABLET ORAL
Qty: 90 TABLET | Refills: 1 | Status: SHIPPED | OUTPATIENT
Start: 2018-05-05 | End: 2018-01-01 | Stop reason: SDUPTHER

## 2018-11-29 NOTE — LETTER
May 1, 2017      Bertha Flores MD  [address] [address]  [address]  [place] [address]           [facility] [place] - Cardiology  [address] [facility] [address] [place] [address]  Phone: [phone]          Patient: Tish Rueda   MR Number: [id number]   YOB: 1913   Date of Visit: 5/1/2017       Dear Dr. Bertha Flores:    Thank you for referring Tish Rueda to me for evaluation. Attached you will find relevant portions of my assessment and plan of care.    If you have questions, please do not hesitate to call me. I look forward to following Tish Rueda along with you.    Sincerely,    Andres Ray MD    Enclosure  CC:  No Recipients    If you would like to receive this communication electronically, please contact [email or web address] or [phone] to request more information on [address] Link access.    For providers and/or their staff who would like to refer a patient to [facility], please contact us through our one-stop-shop provider referral line, Marcello Kim, at [phone].    If you feel you have received this communication in error or would no longer like to receive these types of communications, please e-mail [email or web address]         
Need for prophylactic measure

## 2019-01-01 ENCOUNTER — TELEPHONE (OUTPATIENT)
Dept: FAMILY MEDICINE | Facility: CLINIC | Age: 84
End: 2019-01-01

## 2019-01-01 ENCOUNTER — HOSPITAL ENCOUNTER (INPATIENT)
Facility: HOSPITAL | Age: 84
LOS: 4 days | DRG: 683 | End: 2019-05-09
Attending: EMERGENCY MEDICINE | Admitting: INTERNAL MEDICINE
Payer: MEDICARE

## 2019-01-01 VITALS
TEMPERATURE: 98 F | RESPIRATION RATE: 18 BRPM | HEIGHT: 59 IN | HEART RATE: 64 BPM | SYSTOLIC BLOOD PRESSURE: 129 MMHG | OXYGEN SATURATION: 91 % | DIASTOLIC BLOOD PRESSURE: 58 MMHG | BODY MASS INDEX: 18.48 KG/M2 | WEIGHT: 91.69 LBS

## 2019-01-01 DIAGNOSIS — R53.1 WEAKNESS: Primary | ICD-10-CM

## 2019-01-01 DIAGNOSIS — R79.89 ELEVATED TROPONIN: ICD-10-CM

## 2019-01-01 DIAGNOSIS — R60.9 SWELLING: ICD-10-CM

## 2019-01-01 DIAGNOSIS — K21.9 GASTROESOPHAGEAL REFLUX DISEASE, ESOPHAGITIS PRESENCE NOT SPECIFIED: ICD-10-CM

## 2019-01-01 DIAGNOSIS — T46.0X1A DIGOXIN TOXICITY: ICD-10-CM

## 2019-01-01 DIAGNOSIS — E16.2 LOW BLOOD SUGAR: ICD-10-CM

## 2019-01-01 DIAGNOSIS — R07.9 CHEST PAIN: ICD-10-CM

## 2019-01-01 LAB
ALBUMIN SERPL BCP-MCNC: 1.7 G/DL (ref 3.5–5.2)
ALBUMIN SERPL BCP-MCNC: 2 G/DL (ref 3.5–5.2)
ALBUMIN SERPL BCP-MCNC: 2 G/DL (ref 3.5–5.2)
ALBUMIN SERPL BCP-MCNC: 2.2 G/DL (ref 3.5–5.2)
ALP SERPL-CCNC: 86 U/L (ref 55–135)
ALP SERPL-CCNC: 90 U/L (ref 55–135)
ALP SERPL-CCNC: 94 U/L (ref 55–135)
ALP SERPL-CCNC: 97 U/L (ref 55–135)
ALT SERPL W/O P-5'-P-CCNC: 5 U/L (ref 10–44)
ALT SERPL W/O P-5'-P-CCNC: 5 U/L (ref 10–44)
ALT SERPL W/O P-5'-P-CCNC: <5 U/L (ref 10–44)
ALT SERPL W/O P-5'-P-CCNC: <5 U/L (ref 10–44)
AMORPH CRY URNS QL MICRO: NORMAL
ANION GAP SERPL CALC-SCNC: 6 MMOL/L (ref 8–16)
ANION GAP SERPL CALC-SCNC: 7 MMOL/L (ref 8–16)
ANION GAP SERPL CALC-SCNC: 7 MMOL/L (ref 8–16)
ANION GAP SERPL CALC-SCNC: 8 MMOL/L (ref 8–16)
AORTIC ROOT ANNULUS: 3.21 CM
AORTIC VALVE CUSP SEPERATION: 1.39 CM
AST SERPL-CCNC: 11 U/L (ref 10–40)
AST SERPL-CCNC: 11 U/L (ref 10–40)
AST SERPL-CCNC: 12 U/L (ref 10–40)
AST SERPL-CCNC: 13 U/L (ref 10–40)
AV INDEX (PROSTH): 0.9
AV MEAN GRADIENT: 2.8 MMHG
AV PEAK GRADIENT: 8.18 MMHG
AV VALVE AREA: 3.72 CM2
AV VELOCITY RATIO: 0.6
BASOPHILS # BLD AUTO: 0.01 K/UL (ref 0–0.2)
BASOPHILS # BLD AUTO: 0.02 K/UL (ref 0–0.2)
BASOPHILS # BLD AUTO: 0.02 K/UL (ref 0–0.2)
BASOPHILS # BLD AUTO: 0.03 K/UL (ref 0–0.2)
BASOPHILS NFR BLD: 0.1 % (ref 0–1.9)
BASOPHILS NFR BLD: 0.2 % (ref 0–1.9)
BASOPHILS NFR BLD: 0.3 % (ref 0–1.9)
BASOPHILS NFR BLD: 0.4 % (ref 0–1.9)
BILIRUB SERPL-MCNC: 0.3 MG/DL (ref 0.1–1)
BILIRUB SERPL-MCNC: 0.4 MG/DL (ref 0.1–1)
BILIRUB SERPL-MCNC: 0.5 MG/DL (ref 0.1–1)
BILIRUB SERPL-MCNC: 0.6 MG/DL (ref 0.1–1)
BILIRUB UR QL STRIP: NEGATIVE
BNP SERPL-MCNC: 661 PG/ML (ref 0–99)
BSA FOR ECHO PROCEDURE: 1.45 M2
BUN SERPL-MCNC: 35 MG/DL (ref 10–30)
BUN SERPL-MCNC: 39 MG/DL (ref 10–30)
BUN SERPL-MCNC: 44 MG/DL (ref 10–30)
BUN SERPL-MCNC: 45 MG/DL (ref 10–30)
CALCIUM SERPL-MCNC: 7.2 MG/DL (ref 8.7–10.5)
CALCIUM SERPL-MCNC: 7.6 MG/DL (ref 8.7–10.5)
CALCIUM SERPL-MCNC: 7.7 MG/DL (ref 8.7–10.5)
CALCIUM SERPL-MCNC: 7.8 MG/DL (ref 8.7–10.5)
CHLORIDE SERPL-SCNC: 113 MMOL/L (ref 95–110)
CHLORIDE SERPL-SCNC: 113 MMOL/L (ref 95–110)
CHLORIDE SERPL-SCNC: 117 MMOL/L (ref 95–110)
CHLORIDE SERPL-SCNC: 122 MMOL/L (ref 95–110)
CLARITY UR: CLEAR
CO2 SERPL-SCNC: 15 MMOL/L (ref 23–29)
CO2 SERPL-SCNC: 18 MMOL/L (ref 23–29)
CO2 SERPL-SCNC: 20 MMOL/L (ref 23–29)
CO2 SERPL-SCNC: 21 MMOL/L (ref 23–29)
COLOR UR: YELLOW
CREAT SERPL-MCNC: 1.6 MG/DL (ref 0.5–1.4)
CREAT SERPL-MCNC: 1.9 MG/DL (ref 0.5–1.4)
CREAT SERPL-MCNC: 2.4 MG/DL (ref 0.5–1.4)
CREAT SERPL-MCNC: 2.5 MG/DL (ref 0.5–1.4)
DIFFERENTIAL METHOD: ABNORMAL
DIGOXIN SERPL-MCNC: 2.8 NG/ML (ref 0.8–2)
DIGOXIN SERPL-MCNC: 2.8 NG/ML (ref 0.8–2)
DIGOXIN SERPL-MCNC: 3.3 NG/ML (ref 0.8–2)
DIGOXIN SERPL-MCNC: 3.9 NG/ML (ref 0.8–2)
DIGOXIN SERPL-MCNC: 3.9 NG/ML (ref 0.8–2)
DIGOXIN SERPL-MCNC: >4 NG/ML (ref 0.8–2)
DOP CALC AO PEAK VEL: 1.43 M/S
DOP CALC AO VTI: 19.91 CM
DOP CALC LVOT AREA: 4.12 CM2
DOP CALC LVOT DIAMETER: 2.29 CM
DOP CALC LVOT PEAK VEL: 0.86 M/S
DOP CALC LVOT STROKE VOLUME: 73.98 CM3
DOP CALCLVOT PEAK VEL VTI: 17.97 CM
E WAVE DECELERATION TIME: 384.84 MSEC
E/A RATIO: 0.72
EOSINOPHIL # BLD AUTO: 0.1 K/UL (ref 0–0.5)
EOSINOPHIL NFR BLD: 1 % (ref 0–8)
EOSINOPHIL NFR BLD: 1.3 % (ref 0–8)
EOSINOPHIL NFR BLD: 1.6 % (ref 0–8)
EOSINOPHIL NFR BLD: 1.7 % (ref 0–8)
ERYTHROCYTE [DISTWIDTH] IN BLOOD BY AUTOMATED COUNT: 15.5 % (ref 11.5–14.5)
ERYTHROCYTE [DISTWIDTH] IN BLOOD BY AUTOMATED COUNT: 15.6 % (ref 11.5–14.5)
ERYTHROCYTE [DISTWIDTH] IN BLOOD BY AUTOMATED COUNT: 15.8 % (ref 11.5–14.5)
ERYTHROCYTE [DISTWIDTH] IN BLOOD BY AUTOMATED COUNT: 16.2 % (ref 11.5–14.5)
EST. GFR  (AFRICAN AMERICAN): 17 ML/MIN/1.73 M^2
EST. GFR  (AFRICAN AMERICAN): 18 ML/MIN/1.73 M^2
EST. GFR  (AFRICAN AMERICAN): 24 ML/MIN/1.73 M^2
EST. GFR  (AFRICAN AMERICAN): 29 ML/MIN/1.73 M^2
EST. GFR  (NON AFRICAN AMERICAN): 15 ML/MIN/1.73 M^2
EST. GFR  (NON AFRICAN AMERICAN): 16 ML/MIN/1.73 M^2
EST. GFR  (NON AFRICAN AMERICAN): 21 ML/MIN/1.73 M^2
EST. GFR  (NON AFRICAN AMERICAN): 25 ML/MIN/1.73 M^2
GLUCOSE SERPL-MCNC: 110 MG/DL (ref 70–110)
GLUCOSE SERPL-MCNC: 110 MG/DL (ref 70–110)
GLUCOSE SERPL-MCNC: 124 MG/DL (ref 70–110)
GLUCOSE SERPL-MCNC: 137 MG/DL (ref 70–110)
GLUCOSE SERPL-MCNC: 388 MG/DL (ref 70–110)
GLUCOSE SERPL-MCNC: 45 MG/DL (ref 70–110)
GLUCOSE SERPL-MCNC: 74 MG/DL (ref 70–110)
GLUCOSE SERPL-MCNC: 74 MG/DL (ref 70–110)
GLUCOSE UR QL STRIP: NEGATIVE
HCT VFR BLD AUTO: 28.5 % (ref 37–48.5)
HCT VFR BLD AUTO: 29.4 % (ref 37–48.5)
HCT VFR BLD AUTO: 29.5 % (ref 37–48.5)
HCT VFR BLD AUTO: 31.2 % (ref 37–48.5)
HGB BLD-MCNC: 9 G/DL (ref 12–16)
HGB BLD-MCNC: 9.2 G/DL (ref 12–16)
HGB BLD-MCNC: 9.4 G/DL (ref 12–16)
HGB BLD-MCNC: 9.9 G/DL (ref 12–16)
HGB UR QL STRIP: NEGATIVE
HYPOCHROMIA BLD QL SMEAR: ABNORMAL
HYPOCHROMIA BLD QL SMEAR: ABNORMAL
IVRT: 0.16 MSEC
KETONES UR QL STRIP: NEGATIVE
LA MINOR: 4.75 CM
LA WIDTH: 3.42 CM
LACTATE SERPL-SCNC: 2 MMOL/L (ref 0.5–2.2)
LEFT ATRIUM SIZE: 4.38 CM
LEUKOCYTE ESTERASE UR QL STRIP: ABNORMAL
LIPASE SERPL-CCNC: 9 U/L (ref 4–60)
LYMPHOCYTES # BLD AUTO: 0.7 K/UL (ref 1–4.8)
LYMPHOCYTES # BLD AUTO: 0.7 K/UL (ref 1–4.8)
LYMPHOCYTES # BLD AUTO: 0.8 K/UL (ref 1–4.8)
LYMPHOCYTES # BLD AUTO: 1 K/UL (ref 1–4.8)
LYMPHOCYTES NFR BLD: 12.4 % (ref 18–48)
LYMPHOCYTES NFR BLD: 7.8 % (ref 18–48)
LYMPHOCYTES NFR BLD: 9.5 % (ref 18–48)
LYMPHOCYTES NFR BLD: 9.6 % (ref 18–48)
MAGNESIUM SERPL-MCNC: 1.5 MG/DL (ref 1.6–2.6)
MAGNESIUM SERPL-MCNC: 1.7 MG/DL (ref 1.6–2.6)
MAGNESIUM SERPL-MCNC: 1.9 MG/DL (ref 1.6–2.6)
MAGNESIUM SERPL-MCNC: 1.9 MG/DL (ref 1.6–2.6)
MCH RBC QN AUTO: 26.1 PG (ref 27–31)
MCH RBC QN AUTO: 26.4 PG (ref 27–31)
MCH RBC QN AUTO: 26.9 PG (ref 27–31)
MCH RBC QN AUTO: 27.1 PG (ref 27–31)
MCHC RBC AUTO-ENTMCNC: 31.3 G/DL (ref 32–36)
MCHC RBC AUTO-ENTMCNC: 31.6 G/DL (ref 32–36)
MCHC RBC AUTO-ENTMCNC: 31.7 G/DL (ref 32–36)
MCHC RBC AUTO-ENTMCNC: 31.9 G/DL (ref 32–36)
MCV RBC AUTO: 83 FL (ref 82–98)
MCV RBC AUTO: 84 FL (ref 82–98)
MCV RBC AUTO: 85 FL (ref 82–98)
MCV RBC AUTO: 85 FL (ref 82–98)
MICROSCOPIC COMMENT: NORMAL
MONOCYTES # BLD AUTO: 0.3 K/UL (ref 0.3–1)
MONOCYTES # BLD AUTO: 0.4 K/UL (ref 0.3–1)
MONOCYTES NFR BLD: 4 % (ref 4–15)
MONOCYTES NFR BLD: 4.2 % (ref 4–15)
MONOCYTES NFR BLD: 4.3 % (ref 4–15)
MONOCYTES NFR BLD: 4.5 % (ref 4–15)
MV PEAK A VEL: 0.89 M/S
MV PEAK E VEL: 0.64 M/S
NEUTROPHILS # BLD AUTO: 6.3 K/UL (ref 1.8–7.7)
NEUTROPHILS # BLD AUTO: 6.5 K/UL (ref 1.8–7.7)
NEUTROPHILS # BLD AUTO: 6.8 K/UL (ref 1.8–7.7)
NEUTROPHILS # BLD AUTO: 7.3 K/UL (ref 1.8–7.7)
NEUTROPHILS NFR BLD: 81.8 % (ref 38–73)
NEUTROPHILS NFR BLD: 84.4 % (ref 38–73)
NEUTROPHILS NFR BLD: 84.4 % (ref 38–73)
NEUTROPHILS NFR BLD: 87 % (ref 38–73)
NITRITE UR QL STRIP: NEGATIVE
PH UR STRIP: 5 [PH] (ref 5–8)
PHOSPHATE SERPL-MCNC: 2.7 MG/DL (ref 2.7–4.5)
PISA TR MAX VEL: 2.19 M/S
PLATELET # BLD AUTO: 100 K/UL (ref 150–350)
PLATELET # BLD AUTO: 118 K/UL (ref 150–350)
PLATELET # BLD AUTO: 120 K/UL (ref 150–350)
PLATELET # BLD AUTO: 128 K/UL (ref 150–350)
PLATELET BLD QL SMEAR: ABNORMAL
PLATELET BLD QL SMEAR: ABNORMAL
PMV BLD AUTO: 10.2 FL (ref 9.2–12.9)
PMV BLD AUTO: 9 FL (ref 9.2–12.9)
PMV BLD AUTO: 9.6 FL (ref 9.2–12.9)
PMV BLD AUTO: 9.6 FL (ref 9.2–12.9)
POCT GLUCOSE: 109 MG/DL (ref 70–110)
POCT GLUCOSE: 116 MG/DL (ref 70–110)
POCT GLUCOSE: 124 MG/DL (ref 70–110)
POCT GLUCOSE: 138 MG/DL (ref 70–110)
POCT GLUCOSE: 138 MG/DL (ref 70–110)
POCT GLUCOSE: 143 MG/DL (ref 70–110)
POCT GLUCOSE: 156 MG/DL (ref 70–110)
POCT GLUCOSE: 169 MG/DL (ref 70–110)
POCT GLUCOSE: 169 MG/DL (ref 70–110)
POCT GLUCOSE: 180 MG/DL (ref 70–110)
POCT GLUCOSE: 232 MG/DL (ref 70–110)
POCT GLUCOSE: 388 MG/DL (ref 70–110)
POCT GLUCOSE: 45 MG/DL (ref 70–110)
POCT GLUCOSE: 50 MG/DL (ref 70–110)
POCT GLUCOSE: 70 MG/DL (ref 70–110)
POCT GLUCOSE: 75 MG/DL (ref 70–110)
POCT GLUCOSE: 77 MG/DL (ref 70–110)
POCT GLUCOSE: 83 MG/DL (ref 70–110)
POCT GLUCOSE: 84 MG/DL (ref 70–110)
POCT GLUCOSE: 91 MG/DL (ref 70–110)
POLYCHROMASIA BLD QL SMEAR: ABNORMAL
POTASSIUM SERPL-SCNC: 3.3 MMOL/L (ref 3.5–5.1)
POTASSIUM SERPL-SCNC: 3.7 MMOL/L (ref 3.5–5.1)
POTASSIUM SERPL-SCNC: 3.8 MMOL/L (ref 3.5–5.1)
POTASSIUM SERPL-SCNC: 4.3 MMOL/L (ref 3.5–5.1)
PROT SERPL-MCNC: 4.9 G/DL (ref 6–8.4)
PROT SERPL-MCNC: 5.4 G/DL (ref 6–8.4)
PROT SERPL-MCNC: 5.5 G/DL (ref 6–8.4)
PROT SERPL-MCNC: 5.8 G/DL (ref 6–8.4)
PROT UR QL STRIP: NEGATIVE
PULM VEIN S/D RATIO: 1.29
PV PEAK D VEL: 0.17 M/S
PV PEAK S VEL: 0.22 M/S
PV PEAK VELOCITY: 0.78 CM/S
RA PRESSURE: 3 MMHG
RBC # BLD AUTO: 3.41 M/UL (ref 4–5.4)
RBC # BLD AUTO: 3.47 M/UL (ref 4–5.4)
RBC # BLD AUTO: 3.53 M/UL (ref 4–5.4)
RBC # BLD AUTO: 3.68 M/UL (ref 4–5.4)
SINUS: 3.2 CM
SODIUM SERPL-SCNC: 141 MMOL/L (ref 136–145)
SODIUM SERPL-SCNC: 144 MMOL/L (ref 136–145)
SP GR UR STRIP: 1.01 (ref 1–1.03)
STJ: 2.54 CM
TR MAX PG: 19.18 MMHG
TRICUSPID ANNULAR PLANE SYSTOLIC EXCURSION: 1.04 CM
TROPONIN I SERPL DL<=0.01 NG/ML-MCNC: 0.21 NG/ML (ref 0–0.03)
TROPONIN I SERPL DL<=0.01 NG/ML-MCNC: 0.43 NG/ML (ref 0–0.03)
TROPONIN I SERPL DL<=0.01 NG/ML-MCNC: 0.5 NG/ML (ref 0–0.03)
TV REST PULMONARY ARTERY PRESSURE: 22 MMHG
URN SPEC COLLECT METH UR: ABNORMAL
UROBILINOGEN UR STRIP-ACNC: NEGATIVE EU/DL
WBC # BLD AUTO: 7.46 K/UL (ref 3.9–12.7)
WBC # BLD AUTO: 7.98 K/UL (ref 3.9–12.7)
WBC # BLD AUTO: 8.09 K/UL (ref 3.9–12.7)
WBC # BLD AUTO: 8.42 K/UL (ref 3.9–12.7)
WBC #/AREA URNS HPF: 3 /HPF (ref 0–5)

## 2019-01-01 PROCEDURE — 83735 ASSAY OF MAGNESIUM: CPT

## 2019-01-01 PROCEDURE — 99223 PR INITIAL HOSPITAL CARE,LEVL III: ICD-10-PCS | Mod: 25,,, | Performed by: INTERNAL MEDICINE

## 2019-01-01 PROCEDURE — 96360 HYDRATION IV INFUSION INIT: CPT

## 2019-01-01 PROCEDURE — 63600175 PHARM REV CODE 636 W HCPCS: Performed by: HOSPITALIST

## 2019-01-01 PROCEDURE — 80162 ASSAY OF DIGOXIN TOTAL: CPT | Mod: 91

## 2019-01-01 PROCEDURE — 94761 N-INVAS EAR/PLS OXIMETRY MLT: CPT

## 2019-01-01 PROCEDURE — 25000003 PHARM REV CODE 250: Performed by: INTERNAL MEDICINE

## 2019-01-01 PROCEDURE — 99233 SBSQ HOSP IP/OBS HIGH 50: CPT | Mod: ,,, | Performed by: INTERNAL MEDICINE

## 2019-01-01 PROCEDURE — 85025 COMPLETE CBC W/AUTO DIFF WBC: CPT

## 2019-01-01 PROCEDURE — 84484 ASSAY OF TROPONIN QUANT: CPT

## 2019-01-01 PROCEDURE — 80053 COMPREHEN METABOLIC PANEL: CPT

## 2019-01-01 PROCEDURE — 25000003 PHARM REV CODE 250: Performed by: HOSPITALIST

## 2019-01-01 PROCEDURE — 63600175 PHARM REV CODE 636 W HCPCS: Performed by: INTERNAL MEDICINE

## 2019-01-01 PROCEDURE — 93010 ELECTROCARDIOGRAM REPORT: CPT | Mod: ,,, | Performed by: INTERNAL MEDICINE

## 2019-01-01 PROCEDURE — 97161 PT EVAL LOW COMPLEX 20 MIN: CPT

## 2019-01-01 PROCEDURE — 99285 EMERGENCY DEPT VISIT HI MDM: CPT | Mod: 25

## 2019-01-01 PROCEDURE — 97110 THERAPEUTIC EXERCISES: CPT

## 2019-01-01 PROCEDURE — 82962 GLUCOSE BLOOD TEST: CPT

## 2019-01-01 PROCEDURE — 81000 URINALYSIS NONAUTO W/SCOPE: CPT

## 2019-01-01 PROCEDURE — 12000002 HC ACUTE/MED SURGE SEMI-PRIVATE ROOM

## 2019-01-01 PROCEDURE — 99223 1ST HOSP IP/OBS HIGH 75: CPT | Mod: 25,,, | Performed by: INTERNAL MEDICINE

## 2019-01-01 PROCEDURE — 84100 ASSAY OF PHOSPHORUS: CPT

## 2019-01-01 PROCEDURE — 99233 PR SUBSEQUENT HOSPITAL CARE,LEVL III: ICD-10-PCS | Mod: ,,, | Performed by: INTERNAL MEDICINE

## 2019-01-01 PROCEDURE — 87040 BLOOD CULTURE FOR BACTERIA: CPT

## 2019-01-01 PROCEDURE — 36415 COLL VENOUS BLD VENIPUNCTURE: CPT

## 2019-01-01 PROCEDURE — 21400001 HC TELEMETRY ROOM

## 2019-01-01 PROCEDURE — 97530 THERAPEUTIC ACTIVITIES: CPT

## 2019-01-01 PROCEDURE — 93010 EKG 12-LEAD: ICD-10-PCS | Mod: ,,, | Performed by: INTERNAL MEDICINE

## 2019-01-01 PROCEDURE — 83605 ASSAY OF LACTIC ACID: CPT

## 2019-01-01 PROCEDURE — 80162 ASSAY OF DIGOXIN TOTAL: CPT

## 2019-01-01 PROCEDURE — 97165 OT EVAL LOW COMPLEX 30 MIN: CPT

## 2019-01-01 PROCEDURE — 25000003 PHARM REV CODE 250: Performed by: EMERGENCY MEDICINE

## 2019-01-01 PROCEDURE — 93005 ELECTROCARDIOGRAM TRACING: CPT

## 2019-01-01 PROCEDURE — 83880 ASSAY OF NATRIURETIC PEPTIDE: CPT

## 2019-01-01 PROCEDURE — 83690 ASSAY OF LIPASE: CPT

## 2019-01-01 PROCEDURE — S5010 5% DEXTROSE AND 0.45% SALINE: HCPCS | Performed by: EMERGENCY MEDICINE

## 2019-01-01 RX ORDER — POTASSIUM CHLORIDE 20 MEQ/15ML
40 SOLUTION ORAL ONCE
Status: DISCONTINUED | OUTPATIENT
Start: 2019-01-01 | End: 2019-01-01

## 2019-01-01 RX ORDER — ACETAMINOPHEN 500 MG
500 TABLET ORAL EVERY 6 HOURS PRN
Status: DISCONTINUED | OUTPATIENT
Start: 2019-01-01 | End: 2019-01-01 | Stop reason: HOSPADM

## 2019-01-01 RX ORDER — HEPARIN SODIUM 5000 [USP'U]/ML
5000 INJECTION, SOLUTION INTRAVENOUS; SUBCUTANEOUS EVERY 12 HOURS
Status: DISCONTINUED | OUTPATIENT
Start: 2019-01-01 | End: 2019-01-01 | Stop reason: HOSPADM

## 2019-01-01 RX ORDER — RAMELTEON 8 MG/1
8 TABLET ORAL NIGHTLY PRN
Status: DISCONTINUED | OUTPATIENT
Start: 2019-01-01 | End: 2019-01-01 | Stop reason: HOSPADM

## 2019-01-01 RX ORDER — DEXTROSE MONOHYDRATE AND SODIUM CHLORIDE 5; .45 G/100ML; G/100ML
1000 INJECTION, SOLUTION INTRAVENOUS
Status: COMPLETED | OUTPATIENT
Start: 2019-01-01 | End: 2019-01-01

## 2019-01-01 RX ORDER — PANTOPRAZOLE SODIUM 40 MG/1
TABLET, DELAYED RELEASE ORAL
Qty: 90 TABLET | Refills: 0 | Status: SHIPPED | OUTPATIENT
Start: 2019-01-01

## 2019-01-01 RX ORDER — DEXTROSE MONOHYDRATE 50 MG/ML
1000 INJECTION, SOLUTION INTRAVENOUS
Status: DISCONTINUED | OUTPATIENT
Start: 2019-01-01 | End: 2019-01-01

## 2019-01-01 RX ORDER — GLUCAGON 1 MG
1 KIT INJECTION
Status: DISCONTINUED | OUTPATIENT
Start: 2019-01-01 | End: 2019-01-01 | Stop reason: HOSPADM

## 2019-01-01 RX ORDER — AMOXICILLIN 250 MG
1 CAPSULE ORAL 2 TIMES DAILY PRN
Status: DISCONTINUED | OUTPATIENT
Start: 2019-01-01 | End: 2019-01-01 | Stop reason: HOSPADM

## 2019-01-01 RX ORDER — MAGNESIUM SULFATE HEPTAHYDRATE 40 MG/ML
2 INJECTION, SOLUTION INTRAVENOUS ONCE
Status: COMPLETED | OUTPATIENT
Start: 2019-01-01 | End: 2019-01-01

## 2019-01-01 RX ORDER — IBUPROFEN 200 MG
16 TABLET ORAL
Status: DISCONTINUED | OUTPATIENT
Start: 2019-01-01 | End: 2019-01-01 | Stop reason: HOSPADM

## 2019-01-01 RX ORDER — DEXTROSE MONOHYDRATE AND SODIUM CHLORIDE 5; .9 G/100ML; G/100ML
INJECTION, SOLUTION INTRAVENOUS CONTINUOUS
Status: DISCONTINUED | OUTPATIENT
Start: 2019-01-01 | End: 2019-01-01 | Stop reason: HOSPADM

## 2019-01-01 RX ORDER — IBUPROFEN 200 MG
24 TABLET ORAL
Status: DISCONTINUED | OUTPATIENT
Start: 2019-01-01 | End: 2019-01-01 | Stop reason: HOSPADM

## 2019-01-01 RX ORDER — PANTOPRAZOLE SODIUM 40 MG/1
40 TABLET, DELAYED RELEASE ORAL DAILY
Status: DISCONTINUED | OUTPATIENT
Start: 2019-01-01 | End: 2019-01-01 | Stop reason: HOSPADM

## 2019-01-01 RX ORDER — SODIUM CHLORIDE 0.9 % (FLUSH) 0.9 %
10 SYRINGE (ML) INJECTION
Status: DISCONTINUED | OUTPATIENT
Start: 2019-01-01 | End: 2019-01-01

## 2019-01-01 RX ORDER — SODIUM CHLORIDE 9 MG/ML
INJECTION, SOLUTION INTRAVENOUS CONTINUOUS
Status: ACTIVE | OUTPATIENT
Start: 2019-01-01 | End: 2019-01-01

## 2019-01-01 RX ORDER — CLONIDINE HYDROCHLORIDE 0.1 MG/1
0.1 TABLET ORAL 3 TIMES DAILY PRN
Status: DISCONTINUED | OUTPATIENT
Start: 2019-01-01 | End: 2019-01-01 | Stop reason: HOSPADM

## 2019-01-01 RX ORDER — NAPROXEN SODIUM 220 MG/1
162 TABLET, FILM COATED ORAL
Status: COMPLETED | OUTPATIENT
Start: 2019-01-01 | End: 2019-01-01

## 2019-01-01 RX ORDER — SODIUM CHLORIDE 9 MG/ML
1000 INJECTION, SOLUTION INTRAVENOUS
Status: COMPLETED | OUTPATIENT
Start: 2019-01-01 | End: 2019-01-01

## 2019-01-01 RX ORDER — ONDANSETRON 2 MG/ML
8 INJECTION INTRAMUSCULAR; INTRAVENOUS EVERY 8 HOURS PRN
Status: DISCONTINUED | OUTPATIENT
Start: 2019-01-01 | End: 2019-01-01 | Stop reason: HOSPADM

## 2019-01-01 RX ORDER — DEXTROSE 50 % IN WATER (D50W) INTRAVENOUS SYRINGE
25
Status: COMPLETED | OUTPATIENT
Start: 2019-01-01 | End: 2019-01-01

## 2019-01-01 RX ADMIN — SODIUM CHLORIDE 1000 ML: 0.9 INJECTION, SOLUTION INTRAVENOUS at 10:05

## 2019-01-01 RX ADMIN — PANTOPRAZOLE SODIUM 40 MG: 40 TABLET, DELAYED RELEASE ORAL at 09:05

## 2019-01-01 RX ADMIN — DEXTROSE AND SODIUM CHLORIDE: 5; .9 INJECTION, SOLUTION INTRAVENOUS at 10:05

## 2019-01-01 RX ADMIN — HEPARIN SODIUM 5000 UNITS: 5000 INJECTION, SOLUTION INTRAVENOUS; SUBCUTANEOUS at 09:05

## 2019-01-01 RX ADMIN — DEXTROSE AND SODIUM CHLORIDE 1000 ML: 5; .45 INJECTION, SOLUTION INTRAVENOUS at 12:05

## 2019-01-01 RX ADMIN — PANTOPRAZOLE SODIUM 40 MG: 40 TABLET, DELAYED RELEASE ORAL at 08:05

## 2019-01-01 RX ADMIN — MAGNESIUM SULFATE 2 G: 2 INJECTION INTRAVENOUS at 06:05

## 2019-01-01 RX ADMIN — DEXTROSE AND SODIUM CHLORIDE: 5; .9 INJECTION, SOLUTION INTRAVENOUS at 09:05

## 2019-01-01 RX ADMIN — SODIUM CHLORIDE: 0.9 INJECTION, SOLUTION INTRAVENOUS at 07:05

## 2019-01-01 RX ADMIN — HEPARIN SODIUM 5000 UNITS: 5000 INJECTION, SOLUTION INTRAVENOUS; SUBCUTANEOUS at 08:05

## 2019-01-01 RX ADMIN — DEXTROSE AND SODIUM CHLORIDE: 5; .9 INJECTION, SOLUTION INTRAVENOUS at 01:05

## 2019-01-01 RX ADMIN — SODIUM CHLORIDE: 0.9 INJECTION, SOLUTION INTRAVENOUS at 03:05

## 2019-01-01 RX ADMIN — DEXTROSE MONOHYDRATE 25 G: 25 INJECTION, SOLUTION INTRAVENOUS at 12:05

## 2019-01-01 RX ADMIN — SODIUM CHLORIDE: 0.9 INJECTION, SOLUTION INTRAVENOUS at 08:05

## 2019-01-01 RX ADMIN — ASPIRIN 81 MG 162 MG: 81 TABLET ORAL at 08:05

## 2019-01-01 RX ADMIN — DEXTROSE AND SODIUM CHLORIDE: 5; .9 INJECTION, SOLUTION INTRAVENOUS at 05:05

## 2019-03-27 NOTE — TELEPHONE ENCOUNTER
----- Message from Tonio Spencer sent at 3/27/2019  3:43 PM CDT -----  Contact: Diandra daughter/[phone]  The patient daughter would like to have orders placed for home health services for the patient.              Thank you

## 2019-03-27 NOTE — TELEPHONE ENCOUNTER
Diandra/patient's daughter requesting home health for patient.  Stated patient is getting weak.  Please advise.

## 2019-04-04 NOTE — TELEPHONE ENCOUNTER
----- Message from Ignacia Toro sent at 4/4/2019 12:59 PM CDT -----  Contact: Haydee with [employer] [phone]  Haydee with [facility] is calling in regards to getting a verbal consent for a request. Please call to advise, Thank you.

## 2019-04-04 NOTE — TELEPHONE ENCOUNTER
----- Message from Carolin Rizzo sent at 4/4/2019  8:48 AM CDT -----  Contact: Rianna Blanca calling to speak to a nurse regarding pt orders. [phone].

## 2019-04-04 NOTE — TELEPHONE ENCOUNTER
Return call to [phone] and spoke with Rianna from [employer]. She wants to know Pt's home bound status, If she's ambulatory, does she require assist with ambulating, did she have a evaluation for home health needs? She also inquired to if her last office visit was 2017, why the sudden need to have home health and why hasn't the Pt been in for a visit prior to the home health request.

## 2019-04-04 NOTE — TELEPHONE ENCOUNTER
Spoke with [employer]'s Health Rep Mrs. Hubbard, and she informed me that Pt was approved for Home health services. She inquired if services was for nursing or PT. Informed her that I didn't know. She stated that she would approve for Nursing services and if PT was needed they could add it. I told her that would be okay. She stated that [facility] would be the provider. She asked that a telephone note be faxed over to her with the Pt's diagnosis @ [phone]. She stated that since Pt hasn't been seen since 2017, she would need the recent telephone order to approve home health services or Pt would be denied.

## 2019-04-05 NOTE — TELEPHONE ENCOUNTER
----- Message from Sherrie Lozada sent at 4/5/2019 11:50 AM CDT -----  Contact: Haydee   Name of Who is Calling: Haydee with [facility] [employer]       What is the request in detail:Haydee with [facility] states [facility] will not except the patient, states the guidelines states the pt must first see the doctor 90 days prior  and 30 days after. Please contact to further discuss and advise      Can the clinic reply by [facility]: No       What Number to Call Back if not in [facility]: [phone]

## 2019-04-05 NOTE — TELEPHONE ENCOUNTER
Per Dr. Wills, try to get patient to come in for office visit.  Per patient's daughter, she will try to get patient in for an office visit, has to arrange transportation first.  Will call back to schedule.    Advised Haydee/[facility], that patient will come in for office visit.  Per Haydee, orders have to be cancelled and re-entered when patient comes in for office visit.     Please be advised.

## 2019-04-05 NOTE — TELEPHONE ENCOUNTER
Ms. Hubbard with [facility] called back requesting for a copy of the telephone encounter that prompted the Home Health order since patient was not seen recent/ I asked if she was sure that this would be sufficient enough and she said yes/ faxed over note to fax#[phone]

## 2019-04-05 NOTE — TELEPHONE ENCOUNTER
[facility] [employer] would like to know what Dr. Wills would like to do regarding order.  Does Dr. Morrow want to have patient come in for office visit or cancel order?  Please advise.

## 2019-05-05 PROBLEM — T46.0X1A DIGOXIN TOXICITY: Status: ACTIVE | Noted: 2019-01-01

## 2019-05-06 PROBLEM — R79.89 ELEVATED TROPONIN: Status: ACTIVE | Noted: 2019-01-01

## 2019-05-06 PROBLEM — N17.9 ACUTE RENAL FAILURE SUPERIMPOSED ON STAGE 4 CHRONIC KIDNEY DISEASE: Status: ACTIVE | Noted: 2019-01-01

## 2019-05-06 PROBLEM — N18.4 ACUTE RENAL FAILURE SUPERIMPOSED ON STAGE 4 CHRONIC KIDNEY DISEASE: Status: ACTIVE | Noted: 2019-01-01

## 2019-05-06 NOTE — PT/OT/SLP EVAL
Occupational Therapy   Evaluation    Name: Tish Rueda  MRN: [id number]  Admitting Diagnosis:  Elevated troponin      Recommendations:     Discharge Recommendations: (pending d/t limited pt participation. will follow up tomorrow, 5/7/19)  Discharge Equipment Recommendations:  (TBD)  Barriers to discharge:  Inaccessible home environment    Assessment:     Tish Rueda is a 89 y.o. female with a medical diagnosis of Elevated troponin.  She presents with lethargy, falling asleep in between each prompt given by OT. Daughter present to give PLOF. Pt has strong family support.  Performance deficits affecting function: weakness, impaired functional mobilty, impaired balance, impaired cognition, decreased upper extremity function, decreased safety awareness, impaired coordination, edema, impaired fine motor, decreased ROM, decreased lower extremity function, impaired self care skills, impaired endurance, impaired cardiopulmonary response to activity.      Rehab Prognosis: Fair; patient would benefit from acute skilled OT services to address these deficits and reach maximum level of function.       Plan:     Patient to be seen 5 x/week to address the above listed problems via self-care/home management, therapeutic activities, therapeutic exercises  · Plan of Care Expires: 05/20/19  · Plan of Care Reviewed with: patient, family    Subjective     Chief Complaint: cold, tired  Patient/Family Comments/goals: Pt daughter stated she would like HH to work with her mom at the house.     Occupational Profile:  Living Environment: Pt lives in a trailer and her two daughters rotate living with her- someone is with her 24/7. Per dtr, the ramp to get into the trailer broke and there are about ~3-5 steps to get inside. Since she is unable to get her mom in/out of the trailer d/t poor accessibility, she doesn't leave the trailer often at all- they have to carry her out if required then place her in the wheelchair. Dtr stated she is  trying to get someone to fix it currently.   Previous level of function: Per dtr: Pt has been bed bound the past 3 weeks. In this time period, she has had total A with all care: dressing, bathing, toileting. Pt has trouble d/t BUE arthritis to hold onto silverware or food to feed herself, so dtr feeds her. Prior to 3 weeks ago, pt was able to sit at the EOB for a sponge bath by dtr.  Pt hasn't stood in about 3 weeks and was walking minimal distance in the trailer using the RW only if she had daughter by her side. Pt was unable to self-propel in the wheelchair.   Equipment Used at Home:  wheelchair, walker, rolling, shower chair  Assistance upon Discharge: family     Pain/Comfort:  · Pain Rating 1: 0/10    Patients cultural, spiritual, Christian conflicts given the current situation: no    Objective:     Communicated with: nurseSue, prior to session.  Patient found HOB elevated with bed alarm, peripheral IV, SCD upon OT entry to room.    General Precautions: Standard, fall, pacemaker   Orthopedic Precautions:N/A   Braces: N/A     Occupational Performance:    Bed Mobility:    · Patient completed Rolling/Turning to Left with  maximal assistance and with side rail    Functional Mobility/Transfers:  · Functional Mobility: Not tested d/t increased lethargy and unwilling to participate. Will continue to assess on 5/7/19.     Activities of Daily Living:  · Grooming: minimum assistance with washing her face (pt only agreed to washing half of her face before quitting)    Cognitive/Visual Perceptual:  Cognitive/Psychosocial Skills:     -       Oriented to: With max encouragement, pt able to state her name, birth date, and identify who her daughter (present) is   -       Follows Commands/attention:very lethargic, falling asleep in between each command  -       Communication: clear/fluent  -       Memory: Impaired STM and Poor immediate recall  -       Safety awareness/insight to disability: impaired   -        Mood/Affect/Coping skills/emotional control: Lethargic  Visual/Perceptual:      -Impaired      Physical Exam:  Balance:    -       continue to assess   Postural examination/scapula alignment:    -       continue to assess  Skin integrity: Visible skin intact  Edema:  Moderate BUE (hands)  Sensation:    -       Intact  Dominant hand:    -       R hand  Upper Extremity Range of Motion:     -       Right Upper Extremity: AROM to 90*; pt made facial expressions with concern for pain with PROM with any increase ROM >90*  -       Left Upper Extremity: AROM to 90*; pt made facial expressions with concern for pain with PROM with any increase ROM >90*  Upper Extremity Strength:    -       Right Upper Extremity: continue to assess. Unable to follow commands for MMT and unable to participate in any functional task to observe functional strength   -       Left Upper Extremity: continue to assess. Unable to follow commands for MMT and unable to participate in any functional task to observe functional strength   Strength:    -       Right Upper Extremity: Deficits: impaired. with moderate edema  -       Left Upper Extremity: Deficits: impaired. with moderate edema  Fine Motor Coordination:    -       Impaired  Left hand thumb/finger opposition skills  , Right hand thumb/finger opposition skills  , Left hand, manipulation of objects   and Right hand, manipulation of objects    Gross motor coordination:   impaired    AMPAC 6 Click ADL:  AMPAC Total Score: 6    Treatment & Education:  Pt was very lethargic, falling asleep in between any prompt given. Daughter and additional family member present to give PLOF and give pt encouragement. Pt's family educated on UE exercises to complete to reduce BUE moderate edema. With max encouragement, pt was able to demo 5 hand squeezes and 5 elbow flexion/extension, but then fell asleep. Pt's dtr reported that her mom was more alert this morning right before lunch, so will attempt tomorrow  morning around that time.   Education:    Patient left HOB elevated with all lines intact, call button in reach and bed alarm on    GOALS:   Multidisciplinary Problems     Occupational Therapy Goals        Problem: Occupational Therapy Goal    Goal Priority Disciplines Outcome Interventions   Occupational Therapy Goal     OT, PT/OT Ongoing (interventions implemented as appropriate)    Description:  Goals to be met by: 05/20/2019    Patient will increase functional independence with ADLs by performing:    Feeding with Moderate Assistance.  Grooming while seated with Moderate Assistance.  Sitting at edge of bed- unable to assess d/t pt lethargy and refusal. Will continue to assess.   Rolling to Bilateral with Moderate Assistance.   Supine to sit withunable to assess d/t pt lethargy and refusal. Will continue to assess.   Squat pivot transfers with unable to assess d/t pt lethargy and refusal. Will continue to assess.   Toilet transfer- unable to assess d/t pt lethargy and refusal. Will continue to assess.   Upper extremity exercise program x10 reps per handout, with assistance as needed.                      History:     Past Medical History:   Diagnosis Date    CHF (congestive heart failure)     Dr. Rivers    Diverticulosis     GERD (gastroesophageal reflux disease)     Hypertension     Pacemaker     Peptic ulcer disease        Past Surgical History:   Procedure Laterality Date    APPENDECTOMY      CARDIAC PACEMAKER PLACEMENT      CHOLECYSTECTOMY      EYE SURGERY      cataract    HYSTERECTOMY      REPLACEMENT-GENERATOR-PACEMAKER Left 10/7/2014    Performed by Sanju Alcantara MD at [facility] CATH LAB    salpingoopherectomy         Time Tracking:     OT Date of Treatment: 05/06/19  OT Start Time: 1414  OT Stop Time: 1435  OT Total Time (min): 21 min    Billable Minutes:Evaluation 21 min    Yuridia Cortez OT  5/6/2019

## 2019-05-06 NOTE — PLAN OF CARE
"TN met with patient and patient's daughter Brenda at bedside to complete discharge needs assessment. TN explained duties of case management to Brenda. TN reviewed "[facility] Packet", "Discharge Planning Begins on Admission" and discussed "Help at Home". Patient will discharge back home with home health  when medically stable. TN also discussed patient's responsibilities to manage her health at home. Brenda was informed to leave folder at bedside during hospital stay. Contact information added to white board.    PCP- Dr. Trista Wills    [place]   [facility] [place] [address]  [address]  [place] [address]  Phone: [phone] Fax: [phone]      Appointment Time Preference: mornings       05/06/19 1205   Discharge Assessment   Assessment Type Discharge Planning Assessment   Assessment information obtained from? Caregiver   Prior to hospitilization cognitive status: Alert/Oriented   Prior to hospitalization functional status: Needs Assistance;Assistive Equipment   Current cognitive status: Unable to Assess   Current Functional Status: Assistive Equipment;Needs Assistance   Lives With child(ebenezer), adult   Able to Return to Prior Arrangements yes   Is patient able to care for self after discharge? No   Who are your caregiver(s) and their phone number(s)? Brenda- daughter @ [phone]   Patient's perception of discharge disposition home or selfcare;home health   Readmission Within the Last 30 Days no previous admission in last 30 days   Patient currently being followed by outpatient case management? No   Patient currently receives any other outside agency services? No   Equipment Currently Used at Home wheelchair;walker, rolling;shower chair   Do you have any problems affording any of your prescribed medications? No   Is the patient taking medications as prescribed? yes   Does the patient have transportation home? Yes   Transportation Anticipated " family or friend will provide   Does the patient receive services at the [facility]? No   Discharge Plan A Home;Home with family;Home Health   Discharge Plan B Home;Home Health;Home with family   Patient/Family in Agreement with Plan yes   Readmission Questionnaire   Have you felt little interest or pleasure in doing things? 1

## 2019-05-06 NOTE — NURSING
Patient has regular diet ordered. Daughter stated that her mother will spit out more solid foods like meat and rice, but has no problems swallowing thin liquids. Modified order to pureed. Will see how patient tolerates and modify as needed. Patient resting comfortably. Gave report to day shift nurse.

## 2019-05-06 NOTE — NURSING
Patient arrived to tele from ED via stretcher. Settled into bed, applied telemetry and took vitals. Patient was responding only to touch and unable to answer questions, so admit was done with daughter. Bed low and locked, alarm on and side rails up x2. Patient resting comfortably. Will continue to monitor.

## 2019-05-06 NOTE — ASSESSMENT & PLAN NOTE
Patient has a moderately increased troponin 0.498 with a two-hour repeat of 0.428.  She has never had previously elevated troponins.  She did complain of chest pains in the last two weeks.  I personally reviewed her EKG and it was significant for a wide QRS-complex rhythm.  Unclear if this actually represents an acute myocardial infarction.  Given her advanced age, acute renal failure, and other significant chronic comorbidities, I doubt she would be an ideal candidate for intervention at this time.  Will consult Cardiology for further recommendations.

## 2019-05-06 NOTE — ASSESSMENT & PLAN NOTE
Patient's urinalysis is significant for a specific gravity of 1.010 but otherwise benign.  Urine output has been fair.  Will obtain additional urine studies; provide aggressive IV fluid hydration; monitor the urine output; recheck the renal function in the morning; and avoid nephrotoxins.  Etiology is likely poor oral intake.

## 2019-05-06 NOTE — PLAN OF CARE
Problem: Adult Inpatient Plan of Care  Goal: Plan of Care Review  Recommendations     1. Encourage adequate intake of meals/oral nutr supplements    2. Consider ST consult to eval swallow function     Goals: Maintain meal intake >50% daily  Nutrition Goal Status: new

## 2019-05-06 NOTE — PLAN OF CARE
Problem: Fall Injury Risk  Goal: Absence of Fall and Fall-Related Injury  Outcome: Ongoing (interventions implemented as appropriate)  Intervention: Identify and Manage Contributors to Fall Injury Risk     05/06/19 1645   Manage Acute Allergic Reaction   Medication Review/Management medications reviewed   Identify and Manage Contributors to Fall Injury Risk   Self-Care Promotion BADL personal objects within reach;independence encouraged;BADL personal routines maintained         Problem: Adult Inpatient Plan of Care  Goal: Plan of Care Review  Outcome: Ongoing (interventions implemented as appropriate)     05/06/19 1645   Plan of Care Review   Plan of Care Reviewed With patient;family       Problem: Skin Injury Risk Increased  Goal: Skin Health and Integrity  Outcome: Ongoing (interventions implemented as appropriate)  Intervention: Optimize Skin Protection     05/06/19 1645   Prevent Additional Skin Injury   Head of Bed (HOB) HOB at 20-30 degrees   Pressure Reduction Devices pressure-redistributing mattress utilized   Pressure Reduction Techniques weight shift assistance provided   Monitor and Manage Hypervolemia   Skin Protection incontinence pads utilized

## 2019-05-06 NOTE — ED NOTES
POCT glucose checked before transporting patient to floor. Blood sugar was 45. Pt alert and asymptomatic. MD notified. MD to order D50W injection and D5W 0.45 NS. Receiving RN (Kimmie) notified pt's transport will be delayed until she receives the orders, has labs drawn and CT performed.

## 2019-05-06 NOTE — SUBJECTIVE & OBJECTIVE
Past Medical History:   Diagnosis Date    CHF (congestive heart failure)     Dr. Rivers    Diverticulosis     GERD (gastroesophageal reflux disease)     Hypertension     Pacemaker     Peptic ulcer disease        Past Surgical History:   Procedure Laterality Date    APPENDECTOMY      CARDIAC PACEMAKER PLACEMENT      CHOLECYSTECTOMY      EYE SURGERY      cataract    HYSTERECTOMY      REPLACEMENT-GENERATOR-PACEMAKER Left 10/7/2014    Performed by Sanju Alcantara MD at [facility] CATH LAB    salpingoopherectomy         Review of patient's allergies indicates:  No Known Allergies    No current facility-administered medications on file prior to encounter.      Current Outpatient Medications on File Prior to Encounter   Medication Sig    DIGOX 125 mcg tablet TAKE 1 TABLET BY MOUTH EVERY DAY    pantoprazole (PROTONIX) 40 MG tablet TAKE 1 TABLET BY MOUTH EVERY DAY    furosemide (LASIX) 20 MG tablet Take 0.5 tablets (10 mg total) by mouth daily as needed (swelling).    meclizine (ANTIVERT) 12.5 mg tablet Take 1 tablet (12.5 mg total) by mouth 2 (two) times daily as needed.    pediatric multivitamin chewable tablet Take 1 tablet by mouth once daily.     Family History     None        Tobacco Use    Smoking status: Never Smoker    Smokeless tobacco: Never Used   Substance and Sexual Activity    Alcohol use: No    Drug use: No    Sexual activity: Never     Review of Systems   Unable to perform ROS: Dementia     Objective:     Vital Signs (Most Recent):  Temp: 97.5 °F (36.4 °C) (05/06/19 0255)  Pulse: 60 (05/06/19 0255)  Resp: 17 (05/06/19 0255)  BP: (!) 107/47 (05/06/19 0255)  SpO2: 100 % (05/06/19 0255) Vital Signs (24h Range):  Temp:  [97.4 °F (36.3 °C)-97.8 °F (36.6 °C)] 97.5 °F (36.4 °C)  Pulse:  [60-84] 60  Resp:  [15-20] 17  SpO2:  [96 %-100 %] 100 %  BP: (100-119)/(47-81) 107/47     Weight: 40.3 kg (88 lb 13.5 oz)  Body mass index is 17.94 kg/m².    Physical Exam   Constitutional: She appears  well-developed. No distress.   Frail, cachectic   HENT:   Head: Normocephalic and atraumatic.   Right Ear: External ear normal.   Left Ear: External ear normal.   Nose: Nose normal.   Eyes: Right eye exhibits no discharge. Left eye exhibits no discharge.   Neck: Normal range of motion.   Cardiovascular: Normal rate, regular rhythm, normal heart sounds and intact distal pulses. Exam reveals no gallop and no friction rub.   No murmur heard.  Pulmonary/Chest: Effort normal and breath sounds normal. No stridor. No respiratory distress. She has no wheezes. She has no rales. She exhibits no tenderness.   Abdominal: Soft. Bowel sounds are normal. She exhibits no distension. There is no tenderness. There is no rebound and no guarding.   Musculoskeletal: Normal range of motion. She exhibits no edema.   Neurological:   Patient is nonverbal   Skin: Skin is warm and dry. She is not diaphoretic. No erythema.   Nursing note and vitals reviewed.          Significant Labs: All pertinent labs within the past 24 hours have been reviewed.    Significant Imaging: I have reviewed and interpreted all pertinent imaging results/findings within the past 24 hours.

## 2019-05-06 NOTE — ED NOTES
Per MD, pt cleared to be transferred to admitted room. Pt and family updated. Receiving RN notified. Transport requested.

## 2019-05-06 NOTE — CONSULTS
"  [facility]  Adult Nutrition  Consult Note    SUMMARY     Recommendations    1. Encourage adequate intake of meals/oral nutr supplements    2. Consider ST consult to eval swallow function    Goals: Maintain meal intake >50% daily  Nutrition Goal Status: new  Communication of MARISEL Recs: reviewed with physician    Reason for Assessment    Reason For Assessment: consult  Diagnosis: (digoxin toxicity)  Relevant Medical History: CHF, GERD, diverticulosis, HTN    General Information Comments: Pt seen this AM, asleep. Daughter @ bedside reports pt had been w/ weakness, poor nutr intake for a few weeks now. Pta she tolerated regular diet but did need some foods the be "mashed up". She would drink Boost/Ensure also. She has never had a ST eval per report. Pt has had some wt loss pta but daughter was unable to quantify it. No recent wts in Epic noted. NFPE performed today; pt w/ some muscle wasting/fat loss which is expected in a 89 yo pt. Pt made DNR today.     Nutrition Discharge Planning: Adequate nutr intake to meet needs    Nutrition Risk Screen    Nutrition Risk Screen: other (see comments)(no desire to eat)    Nutrition/Diet History    Spiritual, Cultural Beliefs, Church Practices, Values that Affect Care: no  Food Allergies: NKFA  Factors Affecting Nutritional Intake: decreased appetite    Anthropometrics    Temp: 96.9 °F (36.1 °C)  Height Method: Estimated  Height: 4' 11" (149.9 cm)  Height (inches): 59 in  Weight Method: Bed Scale  Weight: 40.3 kg (88 lb 13.5 oz)  Weight (lb): 88.85 lb  Ideal Body Weight (IBW), Female: 95 lb  % Ideal Body Weight, Female (lb): 93.53 lb  BMI (Calculated): 18  BMI Grade: 17 - 18.4 protein-energy malnutrition grade I       Lab/Procedures/Meds    Pertinent Labs Reviewed: reviewed  Pertinent Labs Comments: BUN 44, Crt 2.4, Alb 2.0  Pertinent Medications Reviewed: reviewed  Pertinent Medications Comments: pantoprazole    Estimated/Assessed Needs    Weight Used For " Calorie Calculations: 40.3 kg (88 lb 13.5 oz)  Energy Calorie Requirements (kcal): 8181-0562  Energy Need Method: Kcal/kg(30-35)     Protein Requirements: 48 g (1.2 g/kg)  Weight Used For Protein Calculations: 40.3 kg (88 lb 13.5 oz)    Estimated Fluid Requirement Method: RDA Method  RDA Method (mL): 1209         Nutrition Prescription Ordered    Current Diet Order: Pureed    Evaluation of Received Nutrient/Fluid Intake    IV Fluid (mL): (NS @ 125 mL/hr)  Energy Calories Required: not meeting needs  Protein Required: not meeting needs  Fluid Required: (per MD)  Comments: LBM 5/5  % Intake of Estimated Energy Needs: 0 - 25 %  % Meal Intake: 0 - 25 %    Nutrition Risk    Level of Risk/Frequency of Follow-up: (F/u 2 x weekly)     Assessment and Plan    Nutrition Problem  Inadequate energy intake    Related to (etiology):   Decreased appetite/lethargy    Signs and Symptoms (as evidenced by):   <85% of EEN/EPN being met currently & pta    Interventions:  Commercial food  Commercial beverage    Nutrition Diagnosis Status:   New         Monitor and Evaluation    Food and Nutrient Intake: energy intake, food and beverage intake  Food and Nutrient Adminstration: diet order  Anthropometric Measurements: weight, weight change  Biochemical Data, Medical Tests and Procedures: electrolyte and renal panel, glucose/endocrine profile  Nutrition-Focused Physical Findings: overall appearance     Malnutrition Assessment     Skin (Micronutrient): (intact)       Energy Intake (Malnutrition): less than 75% for greater than 7 days           Edema (Fluid Accumulation): 0-->no edema present   Subcutaneous Fat Loss (Final Summary): (Pt w/ fat loss that is expected in a 89 yo pt)  Muscle Loss Evaluation (Final Summary): (pt w/ muscle loss that is expected in a 89 yo pt)         Nutrition Follow-Up    RD Follow-up?: Yes

## 2019-05-06 NOTE — SUBJECTIVE & OBJECTIVE
Past Medical History:   Diagnosis Date    CHF (congestive heart failure)     Dr. Rivers    Diverticulosis     GERD (gastroesophageal reflux disease)     Hypertension     Pacemaker     Peptic ulcer disease        Past Surgical History:   Procedure Laterality Date    APPENDECTOMY      CARDIAC PACEMAKER PLACEMENT      CHOLECYSTECTOMY      EYE SURGERY      cataract    HYSTERECTOMY      REPLACEMENT-GENERATOR-PACEMAKER Left 10/7/2014    Performed by Sanju Alcantara MD at [facility] CATH LAB    salpingoopherectomy         Review of patient's allergies indicates:  No Known Allergies    No current facility-administered medications on file prior to encounter.      Current Outpatient Medications on File Prior to Encounter   Medication Sig    DIGOX 125 mcg tablet TAKE 1 TABLET BY MOUTH EVERY DAY    pantoprazole (PROTONIX) 40 MG tablet TAKE 1 TABLET BY MOUTH EVERY DAY    furosemide (LASIX) 20 MG tablet Take 0.5 tablets (10 mg total) by mouth daily as needed (swelling).    meclizine (ANTIVERT) 12.5 mg tablet Take 1 tablet (12.5 mg total) by mouth 2 (two) times daily as needed.    pediatric multivitamin chewable tablet Take 1 tablet by mouth once daily.     Family History     None        Tobacco Use    Smoking status: Never Smoker    Smokeless tobacco: Never Used   Substance and Sexual Activity    Alcohol use: No    Drug use: No    Sexual activity: Never     Review of Systems   Unable to perform ROS: dementia     Objective:     Vital Signs (Most Recent):  Temp: 96.9 °F (36.1 °C) (05/06/19 0759)  Pulse: 60 (05/06/19 0759)  Resp: 18 (05/06/19 0759)  BP: (!) 114/58 (05/06/19 0759)  SpO2: 100 % (05/06/19 0759) Vital Signs (24h Range):  Temp:  [96.9 °F (36.1 °C)-97.8 °F (36.6 °C)] 96.9 °F (36.1 °C)  Pulse:  [60-84] 60  Resp:  [15-20] 18  SpO2:  [96 %-100 %] 100 %  BP: (100-119)/(47-81) 114/58     Weight: 40.3 kg (88 lb 13.5 oz)  Body mass index is 17.94 kg/m².    SpO2: 100 %  O2 Device (Oxygen Therapy): room  air      Intake/Output Summary (Last 24 hours) at 5/6/2019 0812  Last data filed at 5/6/2019 0659  Gross per 24 hour   Intake 535 ml   Output 300 ml   Net 235 ml       Lines/Drains/Airways     Peripheral Intravenous Line                 Peripheral IV - Single Lumen 05/05/19 1933 20 G Left Forearm less than 1 day                Physical Exam   Constitutional: She is oriented to person, place, and time. She appears lethargic. She appears cachectic.   HENT:   Head: Normocephalic.   Eyes: Pupils are equal, round, and reactive to light.   Neck: Normal range of motion. Neck supple.   Cardiovascular: Normal rate and regular rhythm.   Paced rhythm on tele   Pulmonary/Chest: Effort normal and breath sounds normal.   Abdominal: Soft. Normal appearance and bowel sounds are normal. There is no tenderness.   Musculoskeletal: Normal range of motion.   Neurological: She is oriented to person, place, and time. She appears lethargic.   Skin: Skin is warm.   Psychiatric: She has a normal mood and affect.   Nursing note and vitals reviewed.      Significant Labs:   BMP:   Recent Labs   Lab 05/05/19 2005 05/06/19 0628    74    141   K 4.3 3.8   * 113*   CO2 20* 21*   BUN 45* 44*   CREATININE 2.5* 2.4*   CALCIUM 7.8* 7.7*   MG 1.9 1.9   , CMP   Recent Labs   Lab 05/05/19 2005 05/06/19 0628    141   K 4.3 3.8   * 113*   CO2 20* 21*    74   BUN 45* 44*   CREATININE 2.5* 2.4*   CALCIUM 7.8* 7.7*   PROT 5.8* 5.4*   ALBUMIN 2.2* 2.0*   BILITOT 0.6 0.5   ALKPHOS 94 90   AST 12 11   ALT 5* <5*   ANIONGAP 8 7*   ESTGFRAFRICA 17* 18*   EGFRNONAA 15* 16*   , CBC   Recent Labs   Lab 05/05/19 2005 05/06/19 0628   WBC 7.98 7.46   HGB 9.4* 9.2*   HCT 29.5* 29.4*   * 118*   , INR No results for input(s): INR, PROTIME in the last 48 hours., Lipid Panel No results for input(s): CHOL, HDL, LDLCALC, TRIG, CHOLHDL in the last 48 hours., Troponin   Recent Labs   Lab 05/05/19 2005 05/05/19  5366  05/06/19  0210   TROPONINI 0.498* 0.428* 0.206*    and All pertinent lab results from the last 24 hours have been reviewed.    Significant Imaging:    TELE (PERSONALLY REVIEWED): AV paced rhythm    ECHO: 5/15/2017      1 - Normal left ventricular systolic function (EF 55-60%).     2 - Concentric hypertrophy.     3 - Left atrial enlargement.     4 - Mild aortic regurgitation.     5 - Trivial mitral regurgitation.     6 - Mild tricuspid regurgitation.     7 - Trivial pulmonic regurgitation.

## 2019-05-06 NOTE — ED TRIAGE NOTES
Pt presents to ED via EMS with c/o hypoglycemia. Family reports pt has been sluggish today and not eating or drinking much. Daughter reports she has taken 1 day to drink an ensure. EMS report upon their arrival  cbg 46 was obtained, pt was given 1 amp d50  And cbg up to 176. Upon ED arrival  noted. Pt is awake. Family report she is at baseline mental status. No acute distress is noted. Will continue to be monitored.

## 2019-05-06 NOTE — HPI
Mrs. Tish Rueda is a 89 y.o. female with essential hypertension, CKD stage 4, anemia of chronic disease, permanent pacemaker in place, GERD, and dementia who presents to [facility] ED with complaints of hypoglycemia today.  Patient's daughter reports that her appetite has been poor for the past two months and that she has become essentially bed-bound over the past three weeks.  She has been sleeping a lot more and in the past two weeks has complained of intermittent chest pain. She would refuse to have EMS activated but today she agree to be taken to the hospital.  She was also more lethargic than usual today.  Upon EMS arrival she was noted to have a capillary blood glucose of 46 mg/dL for which she was given an ampule of 50% dextrose.  Her capillary glucose upon arrival here was 176 mg/dL and her family reports that her mentation is at baseline.  Further history is otherwise limited at this time.

## 2019-05-06 NOTE — ASSESSMENT & PLAN NOTE
Her digoxin level was noted to be > 4.0 in the setting of worsening renal failure; her potassium is normal.  Poison Control was contacted and recommended serial digoxin levels along with supportive therapy with IV fluids and cardiac monitoring.  It is unclear why the patient is on digoxin in the first place?  She does not have a history of atrial fibrillation but her history did report congestive heart failure for which she is supposedly following with Dr. Uriel Rivers.  Will repeat her TTE in the morning and consult Cardiology for further recommendations.

## 2019-05-06 NOTE — CARE UPDATE
I have reviewed all notes, vitals and labs, and I agree with my colleague's assessment and plan at time of admission. 89 yo female presenting with progressively worsening lethargy and poor appetite for several weeks admitted for digoxin toxicity. Elevated troponin noted. Stopped dig and started on fluids. Cardiology consulted. Considering her advanced age, family wants patient to be DNR. Has a pacemaker which will to be interrogated today. Suspecting chronic toxicity, with renal failure contributing to toxicity. Patient non toxic and HDS, afebrile. Considered her advanced age, and stability, will treat conservatively with fluid while avoiding digiband for now.

## 2019-05-06 NOTE — ED PROVIDER NOTES
"Encounter Date: 5/5/2019    SCRIBE #1 NOTE: I, Dylan Tapan, am scribing for, and in the presence of,  Alesia Herrera MD. I have scribed the following portions of the note - Other sections scribed: HPI, ROS, PE.       History     Chief Complaint   Patient presents with    Hypoglycemia     family reports pt has been sluggish today, upon EMS arrival pt cbg 46 given 1 amp d50  up to 176     CC: Hypoglycemia    HPI: This 89 y.o female with medical h/o HTN, GERD, CHF, pacemaker, diverticulosis, peptic ulcer disease, appendectomy, cholecystectomy, presents to the ED via EMS accompanied by her son and daughter for an evaluation of hypoglycemia. Pt lives at home with her two daughters and is taken care of by them. Per pt's daughter, pt has been having decreased appetite for the last "couple" of months. She reports that pt recently stopped moving on her own, noting that she needs to be assisted by another individual to get out of bed or ambulating around by a wheelchair. She denies any recent falls. Today, pt's daughter reports that pt has been acting  sleepier than she usually is. Pt did not eat/drinking as much today as well and was struggling to finish x1 small bottle of Ensure since this morning. Pt later on c/o not feeling fell and nauseated so EMS was activated since she does not usually make complaints. . Upon EMS arrival to pt, they report CBG 46 mg/dL. They had given pt x1 amp d50 while en route to the ED with improvement to 176 mg/dL. Pt currently feels fine and has no complaints. No CP or SOB. No pain anywhere. Her PCP is MD Dr. Trista Wills.  No abdominal pain.  Patient denies any pain.  No chest pain.  No shortness of breath. Patient has been using wheelchair for the past several months.  No recent trauma or fall.  They have not noticed new right lower extremity swelling. Family reports patient is at baseline mental status.  No altered mental status.  No change in behavior except for not wanting to " eat.    The history is provided by the patient. No  was used.     Review of patient's allergies indicates:  No Known Allergies  Past Medical History:   Diagnosis Date    CHF (congestive heart failure)     Dr. Rivers    Diverticulosis     GERD (gastroesophageal reflux disease)     Hypertension     Pacemaker     Peptic ulcer disease      Past Surgical History:   Procedure Laterality Date    APPENDECTOMY      CARDIAC PACEMAKER PLACEMENT      CHOLECYSTECTOMY      EYE SURGERY      cataract    HYSTERECTOMY      REPLACEMENT-GENERATOR-PACEMAKER Left 10/7/2014    Performed by Sanju Alcantara MD at [facility] CATH LAB    salpingoopherectomy       History reviewed. No pertinent family history.  Social History     Tobacco Use    Smoking status: Never Smoker    Smokeless tobacco: Never Used   Substance Use Topics    Alcohol use: No    Drug use: No     Review of Systems   Constitutional: Positive for appetite change. Negative for chills and fever.   HENT: Negative for congestion, ear pain, rhinorrhea and sore throat.    Eyes: Negative for pain and visual disturbance.   Respiratory: Negative for cough and shortness of breath.    Cardiovascular: Negative for chest pain.   Gastrointestinal: Negative for abdominal pain, diarrhea, nausea and vomiting.   Endocrine: Negative.    Genitourinary: Negative for dysuria.   Musculoskeletal: Negative for back pain and neck pain.   Skin: Negative for rash.   Allergic/Immunologic: Negative.    Neurological: Negative for headaches.   Hematological: Negative.    Psychiatric/Behavioral: Negative.    All other systems reviewed and are negative.      Physical Exam     Initial Vitals [05/05/19 1931]   BP Pulse Resp Temp SpO2   118/60 73 15 97.4 °F (36.3 °C) --      MAP       --         Physical Exam    Nursing note and vitals reviewed.  Constitutional: She appears well-developed and well-nourished. She is not diaphoretic. No distress.   HENT:   Head: Normocephalic and  atraumatic.   Nose: Nose normal.   Eyes: Conjunctivae and EOM are normal. Pupils are equal, round, and reactive to light.   Neck: Normal range of motion. Neck supple.   Cardiovascular: Regular rhythm.   No murmur heard.  Pulmonary/Chest: Breath sounds normal. No respiratory distress.   Abdominal: Soft. Bowel sounds are normal. She exhibits no distension. There is no tenderness.   No ttp ; no pain on palp   Musculoskeletal: Normal range of motion. She exhibits no tenderness.   rle more swollen than left   Neurological: She is alert.   Patient cannot answer name location or year.  Daughter reports she usually knows her name.   Skin: Skin is warm and dry.   Psychiatric: She has a normal mood and affect. Thought content normal.         ED Course   Procedures  Labs Reviewed   CBC W/ AUTO DIFFERENTIAL - Abnormal; Notable for the following components:       Result Value    RBC 3.47 (*)     Hemoglobin 9.4 (*)     Hematocrit 29.5 (*)     Mean Corpuscular Hemoglobin Conc 31.9 (*)     RDW 15.5 (*)     Platelets 128 (*)     Gran% 81.8 (*)     Lymph% 12.4 (*)     All other components within normal limits   POCT GLUCOSE - Abnormal; Notable for the following components:    POCT Glucose 124 (*)     All other components within normal limits   COMPREHENSIVE METABOLIC PANEL   TROPONIN I   B-TYPE NATRIURETIC PEPTIDE   URINALYSIS, REFLEX TO URINE CULTURE   DIGOXIN LEVEL   MAGNESIUM   LIPASE     EKG Readings: (Independently Interpreted)   Paced no stemi         Imaging Results          X-Ray Chest AP Portable (In process)                             Scribe Attestation:   Scribe #1: I performed the above scribed service and the documentation accurately describes the services I performed. I attest to the accuracy of the note.    Attending Attestation:           Physician Attestation for Scribe:  Physician Attestation Statement for Scribe #1: I, Alesia Herrera MD, reviewed documentation, as scribed by Dylan Phelan in my presence, and it  is both accurate and complete.         Attending ED Notes:   Patient is here with daughter that lives with her.  Daughter reports decreased appetite is not want to eat.  Patient has no complaints right now.  Smiling.  Denies any pain in her body.  We will do workup and likely admit this patient.  First troponin is positive. Will give aspirin.  No obvious STEMI on EKG.  Patient is paced.  She is not on any oral hypoglycemics/sulfonylureas      Sec EKG no significant change from 1st    I spoke with poison Control.  They report that nausea vision changes dysrhythmia and heart blocks are associated with digoxin toxicity.  Most likely this is chronic.  They recommend we hydrating the patient.  Did fat is indicated for any sort of dysrhythmia heart block.  They recommend digoxin levels every 6 hr.  I have is patient Dr. Salazar.  Requesting Accu-Chek every 4 hr.             Clinical Impression:  Hypoglycemia                                  Alesia Herrera MD  05/05/19 2054       Alesia Herrera MD  05/05/19 2059       Alesia Herrera MD  05/05/19 8123

## 2019-05-06 NOTE — CONSULTS
[facility]  Cardiology  Consult Note    Patient Name: Tish Rueda  MRN: [id number]  Admission Date: 5/5/2019  Hospital Length of Stay: 1 days  Code Status: Full Code   Attending Provider: Toribio Cruz MD   Consulting Provider: Campos Keith MD  Primary Care Physician: Trista Wills MD  Principal Problem:Elevated troponin    Patient information was obtained from patient's family and ER records.     Inpatient consult to Cardiology  Consult performed by: Campos Keith MD  Consult ordered by: Dilshad Angel MD        Subjective:     Chief Complaint:  Weakness    HPI:   Patient is nonverbal.  The history is obtained from the family.  The patient's family states that for the past 1 week she has been becoming more and more tired and she stops walking a few weeks ago.  She has been moving around in a wheelchair.  He she did complain of some chest pains for the past few days.  Yesterday she was feeling very weak and became listless and that is why EMS was called.  When EMS came her blood sugar was found to be low.  On presentation to the ER she was found to be in acute renal failure and digoxin was found to be elevated.  The family is unable to tell why the patient was on digoxin.  They said that they thought that it was a blood pressure medication.  Currently laying in bed and not answering any questions.  All history was obtained from family members.      ER course:  Mrs. Tish Rueda is a 89 y.o. female with essential hypertension, CKD stage 4, anemia of chronic disease, permanent pacemaker in place, GERD, and dementia who presents to [facility] ED with complaints of hypoglycemia today.  Patient's daughter reports that her appetite has been poor for the past two months and that she has become essentially bed-bound over the past three weeks.  She has been sleeping a lot more and in the past two weeks has complained of intermittent chest pain. She would refuse to have EMS activated but today she  agree to be taken to the hospital.  She was also more lethargic than usual today.  Upon EMS arrival she was noted to have a capillary blood glucose of 46 mg/dL for which she was given an ampule of 50% dextrose.  Her capillary glucose upon arrival here was 176 mg/dL and her family reports that her mentation       Past Medical History:   Diagnosis Date    CHF (congestive heart failure)     Dr. Rivers    Diverticulosis     GERD (gastroesophageal reflux disease)     Hypertension     Pacemaker     Peptic ulcer disease        Past Surgical History:   Procedure Laterality Date    APPENDECTOMY      CARDIAC PACEMAKER PLACEMENT      CHOLECYSTECTOMY      EYE SURGERY      cataract    HYSTERECTOMY      REPLACEMENT-GENERATOR-PACEMAKER Left 10/7/2014    Performed by Sanju Alc[facility]ara MD at [facility] CATH LAB    salpingoopherectomy         Review of patient's allergies indicates:  No Known Allergies    No current facility-administered medications on file prior to encounter.      Current Outpatient Medications on File Prior to Encounter   Medication Sig    DIGOX 125 mcg tablet TAKE 1 TABLET BY MOUTH EVERY DAY    pantoprazole (PROTONIX) 40 MG tablet TAKE 1 TABLET BY MOUTH EVERY DAY    furosemide (LASIX) 20 MG tablet Take 0.5 tablets (10 mg total) by mouth daily as needed (swelling).    meclizine (ANTIVERT) 12.5 mg tablet Take 1 tablet (12.5 mg total) by mouth 2 (two) times daily as needed.    pediatric multivitamin chewable tablet Take 1 tablet by mouth once daily.     Family History     None        Tobacco Use    Smoking status: Never Smoker    Smokeless tobacco: Never Used   Substance and Sexual Activity    Alcohol use: No    Drug use: No    Sexual activity: Never     Review of Systems   Unable to perform ROS: dementia     Objective:     Vital Signs (Most Recent):  Temp: 96.9 °F (36.1 °C) (05/06/19 0759)  Pulse: 60 (05/06/19 0759)  Resp: 18 (05/06/19 0759)  BP: (!) 114/58 (05/06/19 0759)  SpO2: 100 % (05/06/19 0759)  Vital Signs (24h Range):  Temp:  [96.9 °F (36.1 °C)-97.8 °F (36.6 °C)] 96.9 °F (36.1 °C)  Pulse:  [60-84] 60  Resp:  [15-20] 18  SpO2:  [96 %-100 %] 100 %  BP: (100-119)/(47-81) 114/58     Weight: 40.3 kg (88 lb 13.5 oz)  Body mass index is 17.94 kg/m².    SpO2: 100 %  O2 Device (Oxygen Therapy): room air      Intake/Output Summary (Last 24 hours) at 5/6/2019 0812  Last data filed at 5/6/2019 0659  Gross per 24 hour   Intake 535 ml   Output 300 ml   Net 235 ml       Lines/Drains/Airways     Peripheral Intravenous Line                 Peripheral IV - Single Lumen 05/05/19 1933 20 G Left Forearm less than 1 day                Physical Exam   Constitutional: She is oriented to person, place, and time. She appears lethargic. She appears cachectic.   HENT:   Head: Normocephalic.   Eyes: Pupils are equal, round, and reactive to light.   Neck: Normal range of motion. Neck supple.   Cardiovascular: Normal rate and regular rhythm.   Paced rhythm on tele   Pulmonary/Chest: Effort normal and breath sounds normal.   Abdominal: Soft. Normal appearance and bowel sounds are normal. There is no tenderness.   Musculoskeletal: Normal range of motion.   Neurological: She is oriented to person, place, and time. She appears lethargic.   Skin: Skin is warm.   Psychiatric: She has a normal mood and affect.   Nursing note and vitals reviewed.      Significant Labs:   BMP:   Recent Labs   Lab 05/05/19 2005 05/06/19 0628    74    141   K 4.3 3.8   * 113*   CO2 20* 21*   BUN 45* 44*   CREATININE 2.5* 2.4*   CALCIUM 7.8* 7.7*   MG 1.9 1.9   , CMP   Recent Labs   Lab 05/05/19 2005 05/06/19 0628    141   K 4.3 3.8   * 113*   CO2 20* 21*    74   BUN 45* 44*   CREATININE 2.5* 2.4*   CALCIUM 7.8* 7.7*   PROT 5.8* 5.4*   ALBUMIN 2.2* 2.0*   BILITOT 0.6 0.5   ALKPHOS 94 90   AST 12 11   ALT 5* <5*   ANIONGAP 8 7*   ESTGFRAFRICA 17* 18*   EGFRNONAA 15* 16*   , CBC   Recent Labs   Lab 05/05/19 2005  05/06/19  0628   WBC 7.98 7.46   HGB 9.4* 9.2*   HCT 29.5* 29.4*   * 118*   , INR No results for input(s): INR, PROTIME in the last 48 hours., Lipid Panel No results for input(s): CHOL, HDL, LDLCALC, TRIG, CHOLHDL in the last 48 hours., Troponin   Recent Labs   Lab 05/05/19 2005 05/05/19 2247 05/06/19  0210   TROPONINI 0.498* 0.428* 0.206*    and All pertinent lab results from the last 24 hours have been reviewed.    Significant Imaging:    TELE (PERSONALLY REVIEWED): AV paced rhythm    ECHO: 5/15/2017      1 - Normal left ventricular systolic function (EF 55-60%).     2 - Concentric hypertrophy.     3 - Left atrial enlargement.     4 - Mild aortic regurgitation.     5 - Trivial mitral regurgitation.     6 - Mild tricuspid regurgitation.     7 - Trivial pulmonic regurgitation.     Assessment and Plan:     * Elevated troponin  Patient with many comorbidities, very advanced aged.  Talked to the patient the family about expectations.  They want the patient to be DNR.  They also do not want any aggressive, invasive procedures to be performed on the patient.  She did have a few episodes of chest pain per family and troponin is mildly elevated, but considering her advanced age and TARA with other comorbidities and wishes of the family, we will continue medical management.  Start aspirin 81 mg qd when possible.     Digoxin toxicity  No obvious indication for digoxin. Discontinue.     Chronic diastolic (congestive) heart failure  Currently appear fluid depleted. IV hydration for TARA. Check ECHO.    CKD stage 4  Management per primary.     Permanent pacemaker in place  Paced rhythm on tele.  [employer] rep has been called for evaluation        VTE Risk Mitigation (From admission, onward)        Ordered     heparin (porcine) injection 5,000 Units  Every 12 hours      05/06/19 0249     IP VTE HIGH RISK PATIENT  Once      05/06/19 0249     Place sequential compression device  Until discontinued      05/06/19 0245      Place LEONILA hose  Until discontinued      05/06/19 1696          Thank you for your consult. I will follow-up with patient. Please contact us if you have any additional questions.    Campos Keith MD  Cardiology   [facility]

## 2019-05-06 NOTE — PLAN OF CARE
Problem: Occupational Therapy Goal  Goal: Occupational Therapy Goal  Goals to be met by: 05/20/2019    Patient will increase functional independence with ADLs by performing:    Feeding with Moderate Assistance.  Grooming while seated with Moderate Assistance.  Sitting at edge of bed- unable to assess d/t pt lethargy and refusal. Will continue to assess.   Rolling to Bilateral with Moderate Assistance.   Supine to sit withunable to assess d/t pt lethargy and refusal. Will continue to assess.   Squat pivot transfers with unable to assess d/t pt lethargy and refusal. Will continue to assess.   Toilet transfer- unable to assess d/t pt lethargy and refusal. Will continue to assess.   Upper extremity exercise program x10 reps per handout, with assistance as needed.    Outcome: Ongoing (interventions implemented as appropriate)  D/c rec. Pending d/t limited participation, lethargy, and refusal of any functional mobility by pt. Will attempt tomorrow, 05/07/19, for more thorough evaluation.

## 2019-05-06 NOTE — H&P
[facility] Hospital Medicine  History & Physical    Patient Name: Tish Rueda  MRN: [id number]  Admission Date: 5/5/2019  Attending Physician: Toribio Cruz MD   Primary Care Provider: Trista Wills MD         Patient information was obtained from ER records.     Subjective:     Principal Problem:Digoxin toxicity    Chief Complaint:  Worsening lethargy today.    HPI: Mrs. Tish Rueda is a 89 y.o. female with essential hypertension, CKD stage 4, anemia of chronic disease, permanent pacemaker in place, GERD, and dementia who presents to [facility] ED with complaints of hypoglycemia today.  Patient's daughter reports that her appetite has been poor for the past two months and that she has become essentially bed-bound over the past three weeks.  She has been sleeping a lot more and in the past two weeks has complained of intermittent chest pain. She would refuse to have EMS activated but today she agree to be taken to the hospital.  She was also more lethargic than usual today.  Upon EMS arrival she was noted to have a capillary blood glucose of 46 mg/dL for which she was given an ampule of 50% dextrose.  Her capillary glucose upon arrival here was 176 mg/dL and her family reports that her mentation is at baseline.  Further history is otherwise limited at this time.    Chart Review:  Previous Hospitalizations  Date Hospital Diagnosis   Mar 2016 [facility] Symptomatic anemia s/p pRBC transfusion 2 units   Dec 2015 [facility] Hematochezia   Oct 2014 [facility] Pacemaker generator exchange   Dec 26, 2012 [facility] Hematochezia   Dec 6, 2012 [facility] Acute gallstone pancreatitis   Feb 2011 [facility] Hematochezia s/p pRBC transfusion 2 units     Outpatient Follow-Up  Date of Visit Physician Service   Sept 2017 Daisy Wills MD Primary Care   Jul 2017 Andres Ray MD Cardiology     Past Medical History:   Diagnosis Date    CHF (congestive heart failure)     Dr. Rivers    Diverticulosis     GERD  (gastroesophageal reflux disease)     Hypertension     Pacemaker     Peptic ulcer disease        Past Surgical History:   Procedure Laterality Date    APPENDECTOMY      CARDIAC PACEMAKER PLACEMENT      CHOLECYSTECTOMY      EYE SURGERY      cataract    HYSTERECTOMY      REPLACEMENT-GENERATOR-PACEMAKER Left 10/7/2014    Performed by [facility]ju [facility] at [facility] CATH LAB    salpingoopherectomy         Review of patient's allergies indicates:  No Known Allergies    No current facility-administered medications on file prior to encounter.      Current Outpatient Medications on File Prior to Encounter   Medication Sig    DIGOX 125 mcg tablet TAKE 1 TABLET BY MOUTH EVERY DAY    pantoprazole (PROTONIX) 40 MG tablet TAKE 1 TABLET BY MOUTH EVERY DAY    furosemide (LASIX) 20 MG tablet Take 0.5 tablets (10 mg total) by mouth daily as needed (swelling).    meclizine (ANTIVERT) 12.5 mg tablet Take 1 tablet (12.5 mg total) by mouth 2 (two) times daily as needed.    pediatric multivitamin chewable tablet Take 1 tablet by mouth once daily.     Family History     None        Tobacco Use    Smoking status: Never Smoker    Smokeless tobacco: Never Used   Substance and Sexual Activity    Alcohol use: No    Drug use: No    Sexual activity: Never     Review of Systems   Unable to perform ROS: Dementia     Objective:     Vital Signs (Most Recent):  Temp: 97.5 °F (36.4 °C) (05/06/19 0255)  Pulse: 60 (05/06/19 0255)  Resp: 17 (05/06/19 0255)  BP: (!) 107/47 (05/06/19 0255)  SpO2: 100 % (05/06/19 0255) Vital Signs (24h Range):  Temp:  [97.4 °F (36.3 °C)-97.8 °F (36.6 °C)] 97.5 °F (36.4 °C)  Pulse:  [60-84] 60  Resp:  [15-20] 17  SpO2:  [96 %-100 %] 100 %  BP: (100-119)/(47-81) 107/47     Weight: 40.3 kg (88 lb 13.5 oz)  Body mass index is 17.94 kg/m².    Physical Exam   Constitutional: She appears well-developed. No distress.   Frail, cachectic   HENT:   Head: Normocephalic and atraumatic.   Right Ear: External ear normal.    Left Ear: External ear normal.   Nose: Nose normal.   Eyes: Right eye exhibits no discharge. Left eye exhibits no discharge.   Neck: Normal range of motion.   Cardiovascular: Normal rate, regular rhythm, normal heart sounds and intact distal pulses. Exam reveals no gallop and no friction rub.   No murmur heard.  Pulmonary/Chest: Effort normal and breath sounds normal. No stridor. No respiratory distress. She has no wheezes. She has no rales. She exhibits no tenderness.   Abdominal: Soft. Bowel sounds are normal. She exhibits no distension. There is no tenderness. There is no rebound and no guarding.   Musculoskeletal: Normal range of motion. She exhibits no edema.   Neurological:   Patient is nonverbal   Skin: Skin is warm and dry. She is not diaphoretic. No erythema.   Nursing note and vitals reviewed.          Significant Labs: All pertinent labs within the past 24 hours have been reviewed.    Significant Imaging: I have reviewed and interpreted all pertinent imaging results/findings within the past 24 hours.    Assessment/Plan:     * Digoxin toxicity  Her digoxin level was noted to be > 4.0 in the setting of worsening renal failure; her potassium is normal.  Poison Control was contacted and recommended serial digoxin levels along with supportive therapy with IV fluids and cardiac monitoring.  It is unclear why the patient is on digoxin in the first place?  She does not have a history of atrial fibrillation but her history did report congestive heart failure for which she is supposedly following with Dr. Uriel Rivers.  Will repeat her TTE in the morning and consult Cardiology for further recommendations.    Acute on CKD stage 4  Patient's urinalysis is significant for a specific gravity of 1.010 but otherwise benign.  Urine output has been fair.  Will obtain additional urine studies; provide aggressive IV fluid hydration; monitor the urine output; recheck the renal function in the morning; and avoid  nephrotoxins.  Etiology is likely poor oral intake.    Elevated troponin  Patient has a moderately increased troponin 0.498 with a two-hour repeat of 0.428.  She has never had previously elevated troponins.  She did complain of chest pains in the last two weeks.  I personally reviewed her EKG and it was significant for a wide QRS-complex rhythm.  Unclear if this actually represents an acute myocardial infarction.  Given her advanced age, acute renal failure, and other significant chronic comorbidities, I doubt she would be an ideal candidate for intervention at this time.  Will consult Cardiology for further recommendations.    Essential hypertension, benign  Her blood pressure has been borderline low; will hold her home regimen of furosemide.    CKD stage 4  As addressed above.    Anemia of chronic disorder  The patient's H/H is stable and consistent with previous laboratory measurements, and the patient exhibits no signs or symptoms of acute bleeding; there is no indication for transfusion.  Will continue to monitor.    Permanent pacemaker in place  Stable; there are no acute issues.    GERD (gastroesophageal reflux disease)  Will continue home regimen of pantoprazole.    VTE Risk Mitigation (From admission, onward)        Ordered     heparin (porcine) injection 5,000 Units  Every 12 hours      05/06/19 0249     IP VTE HIGH RISK PATIENT  Once      05/06/19 0249     Place sequential compression device  Until discontinued      05/06/19 0245     Place LEONILA hose  Until discontinued      05/06/19 0245             Dilshad Angel M.D.  Staff Nocturnist  Department of Hospital Medicine  [facility]  Pager: [phone]          N.B.: Portions of this note was dictated using M*Modal Fluency Direct--there may be voice recognition errors occasionally missed on review.

## 2019-05-06 NOTE — PLAN OF CARE
Problem: Skin Injury Risk Increased  Goal: Skin Health and Integrity  Outcome: Ongoing (interventions implemented as appropriate)  Intervention: Optimize Skin Protection     05/06/19 0414   Prevent Additional Skin Injury   Head of Bed (HOB) HOB at 20 degrees   Pressure Reduction Devices pressure-redistributing mattress utilized   Pressure Reduction Techniques weight shift assistance provided   Monitor and Manage Hypervolemia   Skin Protection adhesive use limited;incontinence pads utilized;tubing/devices free from skin contact     Intervention: Promote and Optimize Oral Intake     05/06/19 0414   Monitor and Manage Anemia   Oral Nutrition Promotion nutritional therapy counseling provided;rest periods promoted

## 2019-05-06 NOTE — ASSESSMENT & PLAN NOTE
Patient with many comorbidities, very advanced aged.  Talked to the patient the family about expectations.  They want the patient to be DNR.  They also do not want any aggressive, invasive procedures to be performed on the patient.  She did have a few episodes of chest pain per family and troponin is mildly elevated, but considering her advanced age and TARA with other comorbidities and wishes of the family, we will continue medical management.  Start aspirin 81 mg qd when possible.

## 2019-05-06 NOTE — ED NOTES
Pt's repeat glucose check 388. MD notified. Per MD, decrease D5W 0.45 NS from 75 mL/hr to 50 mL/hr. Receiving RN updated.

## 2019-05-06 NOTE — HPI
Patient is nonverbal.  The history is obtained from the family.  The patient's family states that for the past 1 week she has been becoming more and more tired and she stops walking a few weeks ago.  She has been moving around in a wheelchair.  He she did complain of some chest pains for the past few days.  Yesterday she was feeling very weak and became listless and that is why EMS was called.  When EMS came her blood sugar was found to be low.  On presentation to the ER she was found to be in acute renal failure and digoxin was found to be elevated.  The family is unable to tell why the patient was on digoxin.  They said that they thought that it was a blood pressure medication.  Currently laying in bed and not answering any questions.  All history was obtained from family members.      ER course:  Mrs. Tish Rueda is a 89 y.o. female with essential hypertension, CKD stage 4, anemia of chronic disease, permanent pacemaker in place, GERD, and dementia who presents to [facility] ED with complaints of hypoglycemia today.  Patient's daughter reports that her appetite has been poor for the past two months and that she has become essentially bed-bound over the past three weeks.  She has been sleeping a lot more and in the past two weeks has complained of intermittent chest pain. She would refuse to have EMS activated but today she agree to be taken to the hospital.  She was also more lethargic than usual today.  Upon EMS arrival she was noted to have a capillary blood glucose of 46 mg/dL for which she was given an ampule of 50% dextrose.  Her capillary glucose upon arrival here was 176 mg/dL and her family reports that her mentation

## 2019-05-07 NOTE — ASSESSMENT & PLAN NOTE
Patient with many comorbidities, very advanced aged.  Talked to the patient the family about expectations.  They want the patient to be DNR.  They also do not want any aggressive, invasive procedures to be performed on the patient.  She did have a few episodes of chest pain per family and troponin is mildly elevated, but considering her advanced age and TARA with other comorbidities and wishes of the family, we will continue medical management.  Start aspirin 81 mg qd when possible. (wide complex on EKG is likely paced rhythm)

## 2019-05-07 NOTE — PROGRESS NOTES
TN met with patient's daughter Brenda at bedside to inform of PT/OT recommendation for SNF placement. TN provided Brenda with a list of SNF facilities to review. Brenda stated they will take patient back home. Brenda would like home health when patient is ready for discharge.

## 2019-05-07 NOTE — HOSPITAL COURSE
Pt admitted with dehydration, ARF and hypoglycemia. Pt oral intake minimal and family at bedside aware. Blood glucose still low and labile. Started on D5 IVF and explained to family that she cannot maintain on IVF for hydration and nutritional support. Suggest palliative care discussion on goals of care/advance care planning. Digoxin level elevated and medication has been dc'd. Cr trending down.     Palliative care met with patient and family and they will consider hospice.  Cr trending down and IVF rate lowered. BS supported with IVF and this concern discussed with family at bedside. PLan for dc tomorrow +/- hospice vs HH.     Updated by nursing staff that patient passed away overnight/early am hours. No signs of distress. Pt was DNR. Dr. Angel pronounced at 2:51 am. Family notified and present.

## 2019-05-07 NOTE — PLAN OF CARE
Problem: Fall Injury Risk  Goal: Absence of Fall and Fall-Related Injury  Outcome: Ongoing (interventions implemented as appropriate)  Intervention: Identify and Manage Contributors to Fall Injury Risk     05/07/19 1735   Manage Acute Allergic Reaction   Medication Review/Management medications reviewed   Identify and Manage Contributors to Fall Injury Risk   Self-Care Promotion independence encouraged;BADL personal objects within reach;BADL personal routines maintained         Problem: Adult Inpatient Plan of Care  Goal: Plan of Care Review  Outcome: Ongoing (interventions implemented as appropriate)     05/07/19 1735   Plan of Care Review   Plan of Care Reviewed With patient;family

## 2019-05-07 NOTE — PROGRESS NOTES
[facility] Hospital Medicine  Progress Note    Patient Name: Tish Rueda  MRN: [id number]  Patient Class: IP- Inpatient   Admission Date: 5/5/2019  Length of Stay: 2 days  Attending Physician: Kia Jesus MD  Primary Care Provider: Trista Wills MD        Subjective:     Principal Problem:Elevated troponin    HPI:  Mrs. Tish Rueda is a 89 y.o. female with essential hypertension, CKD stage 4, anemia of chronic disease, permanent pacemaker in place, GERD, and dementia who presents to [facility] ED with complaints of hypoglycemia today.  Patient's daughter reports that her appetite has been poor for the past two months and that she has become essentially bed-bound over the past three weeks.  She has been sleeping a lot more and in the past two weeks has complained of intermittent chest pain. She would refuse to have EMS activated but today she agree to be taken to the hospital.  She was also more lethargic than usual today.  Upon EMS arrival she was noted to have a capillary blood glucose of 46 mg/dL for which she was given an ampule of 50% dextrose.  Her capillary glucose upon arrival here was 176 mg/dL and her family reports that her mentation is at baseline.  Further history is otherwise limited at this time.    Hospital Course:  Pt admitted with dehydration, ARF and hypoglycemia. Pt oral intake minimal and family at bedside aware. Blood glucose still low and labile. Started on D5 IVF and explained to family that she cannot maintain on IVF for hydration and nutritional support. Suggest palliative care discussion on goals of care/advance care planning. Digoxin level elevated and medication has been dc'd. Cr trending down.     Interval History: pt not eating or drinking much and cannot be supported on IVF, family seems to not understand, pt is DNR    Review of Systems   Unable to perform ROS: Dementia     Objective:     Vital Signs (Most Recent):  Temp: 97.6 °F (36.4 °C) (05/07/19  0722)  Pulse: 60 (05/07/19 0722)  Resp: 16 (05/07/19 0722)  BP: (!) 128/57 (05/07/19 0722)  SpO2: 100 % (05/07/19 0722) Vital Signs (24h Range):  Temp:  [97.1 °F (36.2 °C)-97.9 °F (36.6 °C)] 97.6 °F (36.4 °C)  Pulse:  [57-61] 60  Resp:  [16-17] 16  SpO2:  [95 %-100 %] 100 %  BP: (100-145)/(55-66) 128/57     Weight: 40.3 kg (88 lb 13.5 oz)  Body mass index is 17.94 kg/m².    Intake/Output Summary (Last 24 hours) at 5/7/2019 1030  Last data filed at 5/7/2019 0700  Gross per 24 hour   Intake 1365 ml   Output --   Net 1365 ml      Physical Exam   Constitutional: She appears well-developed. No distress.   Frail, cachectic   HENT:   Head: Normocephalic and atraumatic.   Right Ear: External ear normal.   Left Ear: External ear normal.   Nose: Nose normal.   Eyes: Right eye exhibits no discharge. Left eye exhibits no discharge.   Neck: Normal range of motion.   Cardiovascular: Normal rate, regular rhythm, normal heart sounds and intact distal pulses. Exam reveals no gallop and no friction rub.   No murmur heard.  Pulmonary/Chest: Effort normal and breath sounds normal. No stridor. No respiratory distress. She has no wheezes. She has no rales. She exhibits no tenderness.   Abdominal: Soft. Bowel sounds are normal. She exhibits no distension. There is no tenderness. There is no rebound and no guarding.   Musculoskeletal: Normal range of motion. She exhibits no edema.   Neurological:   Patient is nonverbal   Skin: Skin is warm and dry. She is not diaphoretic. No erythema.   Nursing note and vitals reviewed.      Significant Labs:   Blood Culture:   Recent Labs   Lab 05/06/19  0050 05/06/19  0104   LABBLOO No Growth to date  No Growth to date No Growth to date  No Growth to date     BMP:   Recent Labs   Lab 05/07/19  0525   GLU 74      K 3.7   *   CO2 18*   BUN 39*   CREATININE 1.9*   CALCIUM 7.6*   MG 1.7     CBC:   Recent Labs   Lab 05/05/19 2005 05/06/19  0628 05/07/19  0525   WBC 7.98 7.46 8.09   HGB 9.4*  9.2* 9.9*   HCT 29.5* 29.4* 31.2*   * 118* 120*     POCT Glucose:   Recent Labs   Lab 05/06/19  2359 05/07/19  0503 05/07/19  0721   POCTGLUCOSE 75 91 50*       Significant Imaging: I have reviewed and interpreted all pertinent imaging results/findings within the past 24 hours.    Assessment/Plan:      * Elevated troponin  Patient has a moderately increased troponin 0.498 with a two-hour repeat of 0.428.  She has never had previously elevated troponins.  She did complain of chest pains in the last two weeks.  I personally reviewed her EKG and it was significant for a wide QRS-complex rhythm.  Unclear if this actually represents an acute myocardial infarction.  Given her advanced age, acute renal failure, and other significant chronic comorbidities, I doubt she would be an ideal candidate for intervention at this time.  Will consult Cardiology for further recommendations.    Acute on CKD stage 4  Patient's urinalysis is significant for a specific gravity of 1.010 but otherwise benign.  Urine output has been fair.  Will obtain additional urine studies; provide aggressive IV fluid hydration; monitor the urine output; recheck the renal function in the morning; and avoid nephrotoxins.  Etiology is likely poor oral intake.    Cr trending down, continue IVF    Digoxin toxicity  Her digoxin level was noted to be > 4.0 in the setting of worsening renal failure; her potassium is normal.  Poison Control was contacted and recommended serial digoxin levels along with supportive therapy with IV fluids and cardiac monitoring.  It is unclear why the patient is on digoxin in the first place?  She does not have a history of atrial fibrillation but her history did report congestive heart failure for which she is supposedly following with Dr. Uriel Rivers.  Will repeat her TTE in the morning and consult Cardiology for further recommendations.    Digoxin dc'd  Pacer evaluated     Anemia of chronic disorder  The patient's H/H  is stable and consistent with previous laboratory measurements, and the patient exhibits no signs or symptoms of acute bleeding; there is no indication for transfusion.  Will continue to monitor.    Essential hypertension, benign  Her blood pressure has been borderline low; will hold her home regimen of furosemide.    CKD stage 4  As addressed above.    GERD (gastroesophageal reflux disease)  Will continue home regimen of pantoprazole.    Permanent pacemaker in place  Stable; there are no acute issues.      VTE Risk Mitigation (From admission, onward)        Ordered     heparin (porcine) injection 5,000 Units  Every 12 hours      05/06/19 0249     IP VTE HIGH RISK PATIENT  Once      05/06/19 0249     Place sequential compression device  Until discontinued      05/06/19 0245     Place LEONILA hose  Until discontinued      05/06/19 0245              Kia Jesus MD  Department of Hospital Medicine   [facility]

## 2019-05-07 NOTE — SUBJECTIVE & OBJECTIVE
Interval History: pt not eating or drinking much and cannot be supported on IVF, family seems to not understand, pt is DNR    Review of Systems   Unable to perform ROS: Dementia     Objective:     Vital Signs (Most Recent):  Temp: 97.6 °F (36.4 °C) (05/07/19 0722)  Pulse: 60 (05/07/19 0722)  Resp: 16 (05/07/19 0722)  BP: (!) 128/57 (05/07/19 0722)  SpO2: 100 % (05/07/19 0722) Vital Signs (24h Range):  Temp:  [97.1 °F (36.2 °C)-97.9 °F (36.6 °C)] 97.6 °F (36.4 °C)  Pulse:  [57-61] 60  Resp:  [16-17] 16  SpO2:  [95 %-100 %] 100 %  BP: (100-145)/(55-66) 128/57     Weight: 40.3 kg (88 lb 13.5 oz)  Body mass index is 17.94 kg/m².    Intake/Output Summary (Last 24 hours) at 5/7/2019 1030  Last data filed at 5/7/2019 0700  Gross per 24 hour   Intake 1365 ml   Output --   Net 1365 ml      Physical Exam   Constitutional: She appears well-developed. No distress.   Frail, cachectic   HENT:   Head: Normocephalic and atraumatic.   Right Ear: External ear normal.   Left Ear: External ear normal.   Nose: Nose normal.   Eyes: Right eye exhibits no discharge. Left eye exhibits no discharge.   Neck: Normal range of motion.   Cardiovascular: Normal rate, regular rhythm, normal heart sounds and intact distal pulses. Exam reveals no gallop and no friction rub.   No murmur heard.  Pulmonary/Chest: Effort normal and breath sounds normal. No stridor. No respiratory distress. She has no wheezes. She has no rales. She exhibits no tenderness.   Abdominal: Soft. Bowel sounds are normal. She exhibits no distension. There is no tenderness. There is no rebound and no guarding.   Musculoskeletal: Normal range of motion. She exhibits no edema.   Neurological:   Patient is nonverbal   Skin: Skin is warm and dry. She is not diaphoretic. No erythema.   Nursing note and vitals reviewed.      Significant Labs:   Blood Culture:   Recent Labs   Lab 05/06/19  0050 05/06/19  0104   LABBLOO No Growth to date  No Growth to date No Growth to date  No Growth  to date     BMP:   Recent Labs   Lab 05/07/19  0525   GLU 74      K 3.7   *   CO2 18*   BUN 39*   CREATININE 1.9*   CALCIUM 7.6*   MG 1.7     CBC:   Recent Labs   Lab 05/05/19 2005 05/06/19  0628 05/07/19  0525   WBC 7.98 7.46 8.09   HGB 9.4* 9.2* 9.9*   HCT 29.5* 29.4* 31.2*   * 118* 120*     POCT Glucose:   Recent Labs   Lab 05/06/19  2359 05/07/19  0503 05/07/19  0721   POCTGLUCOSE 75 91 50*       Significant Imaging: I have reviewed and interpreted all pertinent imaging results/findings within the past 24 hours.

## 2019-05-07 NOTE — PLAN OF CARE
Problem: Physical Therapy Goal  Goal: Physical Therapy Goal  Goals to be met by: 19     Patient will increase functional independence with mobility by performin. Supine to sit with Maximum Assistance  2. Rolling to Left and Right with Moderate Assistance.  3. Sit to stand transfer with Maximum Assistance  4. Sitting at edge of bed x 10 minutes with Moderate Assistance  5. Lower extremity exercise program x 10-20 reps per handout, with assistance as needed    Outcome: Ongoing (interventions implemented as appropriate)  PT for functional mob training and ex

## 2019-05-07 NOTE — ASSESSMENT & PLAN NOTE
Patient's urinalysis is significant for a specific gravity of 1.010 but otherwise benign.  Urine output has been fair.  Will obtain additional urine studies; provide aggressive IV fluid hydration; monitor the urine output; recheck the renal function in the morning; and avoid nephrotoxins.  Etiology is likely poor oral intake.    Cr trending down, continue IVF

## 2019-05-07 NOTE — PLAN OF CARE
Problem: Occupational Therapy Goal  Goal: Occupational Therapy Goal  Goals to be met by: 05/20/2019    Patient will increase functional independence with ADLs by performing:    Feeding with Moderate Assistance.  Grooming while seated with Moderate Assistance.  Sitting at edge of bed with Moderate Assistance.   Rolling to Bilateral with Moderate Assistance.   Supine to sit with Maximum Assistance.   Squat pivot transfers with Maximum Assistance.     Upper extremity exercise program x10 reps per handout, with assistance as needed.     Outcome: Ongoing (interventions implemented as appropriate)  Pt will benefit from continued OT acute services. OT rec. SNF at d/c. Dtr educated on HH vs. SNF at d/c and the level of care her mother will require. Pt's dtr very emotional and requested to discuss this with her siblings.

## 2019-05-07 NOTE — PT/OT/SLP EVAL
Physical Therapy Evaluation    Patient Name:  Tish Rueda   MRN:  [id number]    Recommendations:     Discharge Recommendations:  nursing facility, skilled vs. 24 hr-care with family if available  Discharge Equipment Recommendations: none   Barriers to discharge: Inaccessible home and Decreased caregiver support    Assessment:     Tish Rueda is a 89 y.o. female admitted with a medical diagnosis of Elevated troponin.  She presents with the following impairments/functional limitations:  weakness, impaired functional mobilty, decreased safety awareness, impaired endurance, impaired balance, impaired self care skills, decreased lower extremity function, decreased upper extremity function, impaired muscle length, impaired cardiopulmonary response to activity, impaired coordination, impaired cognition; pt has fair motivation for PT, fearful to fall despite pt was sitting and sidelying in bed, had min dizziness upon sitting up; BP after pt was assisted to sidelying and supine was at 119/59 mm Hg.    Rehab Prognosis: Fair; patient would benefit from acute skilled PT services to address these deficits and reach maximum level of function.    Recent Surgery: * No surgery found *      Plan:     During this hospitalization, patient to be seen 5 x/week(M-F) to address the identified rehab impairments via therapeutic activities, therapeutic exercises, neuromuscular re-education and progress toward the following goals:    · Plan of Care Expires:  05/21/19    Subjective     Chief Complaint: pt states she is min dizzy  Patient/Family Comments/goals: afraid to fall despite pt was stable in sitting  Pain/Comfort:  · Pain Rating 1: 0/10  · Pain Rating Post-Intervention 1: 0/10    Patients cultural, spiritual, Hoahaoism conflicts given the current situation: no    Living Environment:  Lives in a mobile home with daughter taking turns taking care of her; has a ramp that is broken now, and family intends to repair soon  Prior to  admission, patients level of function was progressively needing assistance with functional mob in the past 5 wks; with pt able to get up on her own in bed approx in Dec 2018; pt has been non- amb  3 months.  Equipment used at home: wheelchair, walker, rolling, shower chair.  DME owned (not currently used): none.  Upon discharge, patient will have assistance from family.    Objective:     Communicated with ELIER Rogers prior to session.  Patient found HOB elevated with bed alarm, peripheral IV  upon PT entry to room.    General Precautions: Standard, pacemaker, fall   Orthopedic Precautions:N/A   Braces: N/A     Exams:  · Cognitive Exam:  Patient is oriented to Person  · Gross Motor Coordination:  min impaired; pt tends to lean to the (R) side while seated, thinking she will fall if she is in midline; slow to move extremities  · Postural Exam:  Patient presented with the following abnormalities:    · -       Rounded shoulders  · -       Forward head  · -       Anterior pelvic tilt  · -       Kyphosis  · RLE ROM: flexed/ contracted on hips and knees  25% of the functional range  · RLE Strength: NT accurately as pt has difficulty following commands  · LLE ROM: flexed/ contracted on hips and knees  25% of the functional range  · LLE Strength: NT accurately as pt has difficulty following commands    Functional Mobility:  · Bed Mobility:     · Rolling Left:  maximal assistance  · Rolling Right: maximal assistance  · Supine to Sit: total assistance  · Sit to Supine: total assistance  · Transfers:     · Sit to Stand:  total assistance with rolling walker, with flexed hips and knees, pt has a tendency to raise feet while coming up to standing so feet had to be held down to prevent sliding of legs as she stood up  · Balance: Sitting: F(-)/P; Standing: P/P      Therapeutic Activities and Exercises:   sitting and balance tolerance training at the edge of bed x 8 min; pt able to sit max 20 sec at SBA otherwise pt started leaning  to the (R) the rest of the time    AM-PAC 6 CLICK MOBILITY  Total Score:9     Patient left HOB elevated with all lines intact, call button in reach, RN notified and daughter Brenda present.    GOALS:   Multidisciplinary Problems     Physical Therapy Goals        Problem: Physical Therapy Goal    Goal Priority Disciplines Outcome Goal Variances Interventions   Physical Therapy Goal     PT, PT/OT Ongoing (interventions implemented as appropriate)     Description:  Goals to be met by: 19     Patient will increase functional independence with mobility by performin. Supine to sit with Maximum Assistance  2. Rolling to Left and Right with Moderate Assistance.  3. Sit to stand transfer with Maximum Assistance  4. Sitting at edge of bed x 10 minutes with Moderate Assistance  5. Lower extremity exercise program x 10-20 reps per handout, with assistance as needed                      History:     Past Medical History:   Diagnosis Date    CHF (congestive heart failure)     Dr. Rivers    Diverticulosis     GERD (gastroesophageal reflux disease)     Hypertension     Pacemaker     Peptic ulcer disease        Past Surgical History:   Procedure Laterality Date    APPENDECTOMY      CARDIAC PACEMAKER PLACEMENT      CHOLECYSTECTOMY      EYE SURGERY      cataract    HYSTERECTOMY      REPLACEMENT-GENERATOR-PACEMAKER Left 10/7/2014    Performed by [facility]ju [facility]ara [facility] at [facility] CATH LAB    salpingoopherectomy         Time Tracking:     PT Received On: 19  PT Start Time: 1100     PT Stop Time: 1130  PT Total Time (min): 30 min     Billable Minutes: Evaluation 8 and Therapeutic Activity 10  Co Tx with OT this session    Olman Macdonald, PT  2019

## 2019-05-07 NOTE — PROGRESS NOTES
[facility]  Cardiology  Progress Note    Patient Name: Tish Rueda  MRN: [id number]  Admission Date: 5/5/2019  Hospital Length of Stay: 2 days  Code Status: DNR   Attending Physician: Kia Jesus MD   Primary Care Physician: Trista Wills MD  Expected Discharge Date:   Principal Problem:Elevated troponin    Subjective:     Hospital Course:   5/7: More alert today. Answered some questions. No more chest pains. Tele : AV PACED.     Past Medical History:   Diagnosis Date    CHF (congestive heart failure)     Dr. Rivers    Diverticulosis     GERD (gastroesophageal reflux disease)     Hypertension     Pacemaker     Peptic ulcer disease        Past Surgical History:   Procedure Laterality Date    APPENDECTOMY      CARDIAC PACEMAKER PLACEMENT      CHOLECYSTECTOMY      EYE SURGERY      cataract    HYSTERECTOMY      REPLACEMENT-GENERATOR-PACEMAKER Left 10/7/2014    Performed by [facility]ara [facility] at [facility] CATH LAB    salpingoopherectomy         Review of patient's allergies indicates:  No Known Allergies    No current facility-administered medications on file prior to encounter.      Current Outpatient Medications on File Prior to Encounter   Medication Sig    DIGOX 125 mcg tablet TAKE 1 TABLET BY MOUTH EVERY DAY    pantoprazole (PROTONIX) 40 MG tablet TAKE 1 TABLET BY MOUTH EVERY DAY    furosemide (LASIX) 20 MG tablet Take 0.5 tablets (10 mg total) by mouth daily as needed (swelling).    meclizine (ANTIVERT) 12.5 mg tablet Take 1 tablet (12.5 mg total) by mouth 2 (two) times daily as needed.    pediatric multivitamin chewable tablet Take 1 tablet by mouth once daily.     Family History     None        Tobacco Use    Smoking status: Never Smoker    Smokeless tobacco: Never Used   Substance and Sexual Activity    Alcohol use: No    Drug use: No    Sexual activity: Never     Review of Systems   Unable to perform ROS: dementia     Objective:     Vital Signs (Most  Recent):  Temp: 97.6 °F (36.4 °C) (05/07/19 0722)  Pulse: 62 (05/07/19 1133)  Resp: 16 (05/07/19 1133)  BP: (!) 119/59 (05/07/19 1133)  SpO2: 95 % (05/07/19 1133) Vital Signs (24h Range):  Temp:  [97.5 °F (36.4 °C)-97.9 °F (36.6 °C)] 97.6 °F (36.4 °C)  Pulse:  [57-62] 62  Resp:  [16-17] 16  SpO2:  [95 %-100 %] 95 %  BP: (106-145)/(56-66) 119/59     Weight: 40.3 kg (88 lb 13.5 oz)  Body mass index is 17.94 kg/m².    SpO2: 95 %  O2 Device (Oxygen Therapy): room air      Intake/Output Summary (Last 24 hours) at 5/7/2019 1508  Last data filed at 5/7/2019 0700  Gross per 24 hour   Intake 1365 ml   Output --   Net 1365 ml       Lines/Drains/Airways     Peripheral Intravenous Line                 Peripheral IV - Single Lumen 05/05/19 1933 20 G Left Forearm 1 day                Physical Exam   Constitutional: She is oriented to person, place, and time. She appears lethargic. She appears cachectic.   HENT:   Head: Normocephalic.   Eyes: Pupils are equal, round, and reactive to light.   Neck: Normal range of motion. Neck supple.   Cardiovascular: Normal rate and regular rhythm.   Paced rhythm on tele   Pulmonary/Chest: Effort normal and breath sounds normal.   Abdominal: Soft. Normal appearance and bowel sounds are normal. There is no tenderness.   Musculoskeletal: Normal range of motion.   Neurological: She is oriented to person, place, and time. She appears lethargic.   Skin: Skin is warm.   Psychiatric: She has a normal mood and affect.   Nursing note and vitals reviewed.      Significant Labs:   BMP:   Recent Labs   Lab 05/05/19 2005 05/06/19 0628 05/07/19 0525    74 74    141 141   K 4.3 3.8 3.7   * 113* 117*   CO2 20* 21* 18*   BUN 45* 44* 39*   CREATININE 2.5* 2.4* 1.9*   CALCIUM 7.8* 7.7* 7.6*   MG 1.9 1.9 1.7   , CMP   Recent Labs   Lab 05/05/19 2005 05/06/19 0628 05/07/19 0525    141 141   K 4.3 3.8 3.7   * 113* 117*   CO2 20* 21* 18*    74 74   BUN 45* 44* 39*    CREATININE 2.5* 2.4* 1.9*   CALCIUM 7.8* 7.7* 7.6*   PROT 5.8* 5.4* 5.5*   ALBUMIN 2.2* 2.0* 2.0*   BILITOT 0.6 0.5 0.4   ALKPHOS 94 90 97   AST 12 11 13   ALT 5* <5* 5*   ANIONGAP 8 7* 6*   ESTGFRAFRICA 17* 18* 24*   EGFRNONAA 15* 16* 21*   , CBC   Recent Labs   Lab 05/05/19 2005 05/06/19  0628 05/07/19  0525   WBC 7.98 7.46 8.09   HGB 9.4* 9.2* 9.9*   HCT 29.5* 29.4* 31.2*   * 118* 120*   , INR No results for input(s): INR, PROTIME in the last 48 hours., Lipid Panel No results for input(s): CHOL, HDL, LDLCALC, TRIG, CHOLHDL in the last 48 hours., Troponin   Recent Labs   Lab 05/05/19 2005 05/05/19  2247 05/06/19  0210   TROPONINI 0.498* 0.428* 0.206*    and All pertinent lab results from the last 24 hours have been reviewed.    Significant Imaging:    TELE (PERSONALLY REVIEWED): AV paced rhythm    ECHO: 5/15/2017      1 - Normal left ventricular systolic function (EF 55-60%).     2 - Concentric hypertrophy.     3 - Left atrial enlargement.     4 - Mild aortic regurgitation.     5 - Trivial mitral regurgitation.     6 - Mild tricuspid regurgitation.     7 - Trivial pulmonic regurgitation.     Echo 5/2019:    · Normal left ventricular systolic function. The estimated ejection fraction is 65%  · Normal LV diastolic function.  · Moderate left atrial enlargement.  · Mild right atrial enlargement.  · Normal central venous pressure (3 mm Hg).  · The estimated PA systolic pressure is 22 mm Hg    Assessment and Plan:     Brief HPI:     * Elevated troponin  Patient with many comorbidities, very advanced aged.  Talked to the patient the family about expectations.  They want the patient to be DNR.  They also do not want any aggressive, invasive procedures to be performed on the patient.  She did have a few episodes of chest pain per family and troponin is mildly elevated, but considering her advanced age and TARA with other comorbidities and wishes of the family, we will continue medical management.  Start aspirin  81 mg qd when possible. (wide complex on EKG is likely paced rhythm)    Digoxin toxicity  No obvious indication for digoxin. Discontinue digoxin.     Chronic diastolic (congestive) heart failure  Currently appear fluid depleted. IV hydration for TARA. Normal LVEF on ECHO    CKD stage 4  Management per primary.  Creatinine improving    Permanent pacemaker in place  Paced rhythm on tele.  PM check did not reveal any significant abnormality in the PM.     Will sign off. Please reconsult if needed.       VTE Risk Mitigation (From admission, onward)        Ordered     heparin (porcine) injection 5,000 Units  Every 12 hours      05/06/19 0249     IP VTE HIGH RISK PATIENT  Once      05/06/19 0249     Place sequential compression device  Until discontinued      05/06/19 0245     Place LEONILA hose  Until discontinued      05/06/19 0245          Campos Keith MD  Cardiology  [facility]

## 2019-05-07 NOTE — ASSESSMENT & PLAN NOTE
Her digoxin level was noted to be > 4.0 in the setting of worsening renal failure; her potassium is normal.  Poison Control was contacted and recommended serial digoxin levels along with supportive therapy with IV fluids and cardiac monitoring.  It is unclear why the patient is on digoxin in the first place?  She does not have a history of atrial fibrillation but her history did report congestive heart failure for which she is supposedly following with Dr. Uriel Rivers.  Will repeat her TTE in the morning and consult Cardiology for further recommendations.    Digoxin dc'd  Pacer evaluated

## 2019-05-07 NOTE — SUBJECTIVE & OBJECTIVE
Past Medical History:   Diagnosis Date    CHF (congestive heart failure)     Dr. Rivers    Diverticulosis     GERD (gastroesophageal reflux disease)     Hypertension     Pacemaker     Peptic ulcer disease        Past Surgical History:   Procedure Laterality Date    APPENDECTOMY      CARDIAC PACEMAKER PLACEMENT      CHOLECYSTECTOMY      EYE SURGERY      cataract    HYSTERECTOMY      REPLACEMENT-GENERATOR-PACEMAKER Left 10/7/2014    Performed by Sanju [facility]ara [facility] at [facility] CATH LAB    salpingoopherectomy         Review of patient's allergies indicates:  No Known Allergies    No current facility-administered medications on file prior to encounter.      Current Outpatient Medications on File Prior to Encounter   Medication Sig    DIGOX 125 mcg tablet TAKE 1 TABLET BY MOUTH EVERY DAY    pantoprazole (PROTONIX) 40 MG tablet TAKE 1 TABLET BY MOUTH EVERY DAY    furosemide (LASIX) 20 MG tablet Take 0.5 tablets (10 mg total) by mouth daily as needed (swelling).    meclizine (ANTIVERT) 12.5 mg tablet Take 1 tablet (12.5 mg total) by mouth 2 (two) times daily as needed.    pediatric multivitamin chewable tablet Take 1 tablet by mouth once daily.     Family History     None        Tobacco Use    Smoking status: Never Smoker    Smokeless tobacco: Never Used   Substance and Sexual Activity    Alcohol use: No    Drug use: No    Sexual activity: Never     Review of Systems   Unable to perform ROS: dementia     Objective:     Vital Signs (Most Recent):  Temp: 97.6 °F (36.4 °C) (05/07/19 0722)  Pulse: 62 (05/07/19 1133)  Resp: 16 (05/07/19 1133)  BP: (!) 119/59 (05/07/19 1133)  SpO2: 95 % (05/07/19 1133) Vital Signs (24h Range):  Temp:  [97.5 °F (36.4 °C)-97.9 °F (36.6 °C)] 97.6 °F (36.4 °C)  Pulse:  [57-62] 62  Resp:  [16-17] 16  SpO2:  [95 %-100 %] 95 %  BP: (106-145)/(56-66) 119/59     Weight: 40.3 kg (88 lb 13.5 oz)  Body mass index is 17.94 kg/m².    SpO2: 95 %  O2 Device (Oxygen Therapy): room  air      Intake/Output Summary (Last 24 hours) at 5/7/2019 1508  Last data filed at 5/7/2019 0700  Gross per 24 hour   Intake 1365 ml   Output --   Net 1365 ml       Lines/Drains/Airways     Peripheral Intravenous Line                 Peripheral IV - Single Lumen 05/05/19 1933 20 G Left Forearm 1 day                Physical Exam   Constitutional: She is oriented to person, place, and time. She appears lethargic. She appears cachectic.   HENT:   Head: Normocephalic.   Eyes: Pupils are equal, round, and reactive to light.   Neck: Normal range of motion. Neck supple.   Cardiovascular: Normal rate and regular rhythm.   Paced rhythm on tele   Pulmonary/Chest: Effort normal and breath sounds normal.   Abdominal: Soft. Normal appearance and bowel sounds are normal. There is no tenderness.   Musculoskeletal: Normal range of motion.   Neurological: She is oriented to person, place, and time. She appears lethargic.   Skin: Skin is warm.   Psychiatric: She has a normal mood and affect.   Nursing note and vitals reviewed.      Significant Labs:   BMP:   Recent Labs   Lab 05/05/19 2005 05/06/19 0628 05/07/19  0525    74 74    141 141   K 4.3 3.8 3.7   * 113* 117*   CO2 20* 21* 18*   BUN 45* 44* 39*   CREATININE 2.5* 2.4* 1.9*   CALCIUM 7.8* 7.7* 7.6*   MG 1.9 1.9 1.7   , CMP   Recent Labs   Lab 05/05/19 2005 05/06/19 0628 05/07/19  0525    141 141   K 4.3 3.8 3.7   * 113* 117*   CO2 20* 21* 18*    74 74   BUN 45* 44* 39*   CREATININE 2.5* 2.4* 1.9*   CALCIUM 7.8* 7.7* 7.6*   PROT 5.8* 5.4* 5.5*   ALBUMIN 2.2* 2.0* 2.0*   BILITOT 0.6 0.5 0.4   ALKPHOS 94 90 97   AST 12 11 13   ALT 5* <5* 5*   ANIONGAP 8 7* 6*   ESTGFRAFRICA 17* 18* 24*   EGFRNONAA 15* 16* 21*   , CBC   Recent Labs   Lab 05/05/19 2005 05/06/19 0628 05/07/19  0525   WBC 7.98 7.46 8.09   HGB 9.4* 9.2* 9.9*   HCT 29.5* 29.4* 31.2*   * 118* 120*   , INR No results for input(s): INR, PROTIME in the last 48 hours.,  Lipid Panel No results for input(s): CHOL, HDL, LDLCALC, TRIG, CHOLHDL in the last 48 hours., Troponin   Recent Labs   Lab 05/05/19 2005 05/05/19  2247 05/06/19  0210   TROPONINI 0.498* 0.428* 0.206*    and All pertinent lab results from the last 24 hours have been reviewed.    Significant Imaging:    TELE (PERSONALLY REVIEWED): AV paced rhythm    ECHO: 5/15/2017      1 - Normal left ventricular systolic function (EF 55-60%).     2 - Concentric hypertrophy.     3 - Left atrial enlargement.     4 - Mild aortic regurgitation.     5 - Trivial mitral regurgitation.     6 - Mild tricuspid regurgitation.     7 - Trivial pulmonic regurgitation.     Echo 5/2019:    · Normal left ventricular systolic function. The estimated ejection fraction is 65%  · Normal LV diastolic function.  · Moderate left atrial enlargement.  · Mild right atrial enlargement.  · Normal central venous pressure (3 mm Hg).  · The estimated PA systolic pressure is 22 mm Hg

## 2019-05-07 NOTE — PT/OT/SLP PROGRESS
Occupational Therapy   Treatment    Name: Tish Rueda  MRN: [id number]  Admitting Diagnosis:  Elevated troponin       Recommendations:     Discharge Recommendations: nursing facility, skilled  Discharge Equipment Recommendations:  none  Barriers to discharge:  Inaccessible home environment    Assessment:     Tish Rueda is a 89 y.o. female with a medical diagnosis of Elevated troponin.  She presents with increased functional activity tolerance today, compared to eval on 5/6/19. Pt has poor static and dynamic sitting balance, with fear of falling and increased retrolean and to the R side. To complete evaluation report:   -Balance: sitting: poor; standing (dynamic/sitting): poor  -Upper Extremity Strength:    Right and Left: 2-/5  Performance deficits affecting function are weakness, impaired functional mobilty, impaired balance, decreased upper extremity function, decreased safety awareness, impaired coordination, impaired skin, impaired cardiopulmonary response to activity, impaired endurance, impaired self care skills, gait instability, decreased lower extremity function, decreased ROM, impaired fine motor, edema.     Rehab Prognosis:  Fair; patient would benefit from acute skilled OT services to address these deficits and reach maximum level of function.       Plan:     Patient to be seen 5 x/week to address the above listed problems via self-care/home management, therapeutic activities, therapeutic exercises  · Plan of Care Expires: 05/20/19  · Plan of Care Reviewed with: patient, daughter    Subjective     Pain/Comfort:  · Pain Rating 1: 0/10    Objective:     Communicated with: nurseSue, prior to session.  Patient found HOB elevated with bed alarm, peripheral IV, telemetry upon OT entry to room.    General Precautions: Standard, fall, pacemaker   Orthopedic Precautions:N/A   Braces: N/A     Occupational Performance:     Bed Mobility:    · Patient completed Rolling/Turning to Left with  maximal  assistance and 2 persons  · Patient completed Rolling/Turning to Right with maximal assistance and 2 persons  · Patient completed Scooting/Bridging with maximal assistance and 2 persons  · Patient completed Supine to Sit with total assistance and 2 persons  · Patient completed Sit to Supine with total assistance and 2 persons     Functional Mobility/Transfers:  · Patient completed Sit <> Stand Transfer with total assistance and of 3 persons  with  rolling walker   · Functional Mobility: Pt unable to keep her feet flat on the ground, as she was prone to slide them in front of her so one person was required to keep her feet in safe position in attempts to stand. Pt attempted 3 times, unable to reach full standing position with all attempts and needing to sit.     Activities of Daily Living:  · Feeding:  moderate assistance with Boost container, with max encouragement to drink   · Upper Body Dressing: maximal assistance to don back gown. Pt able to attempt assist with threading her BUE through the sleeve.       [facility] 6 Click ADL: 9    Treatment & Education:  Pt consented to OT treatment with Brenda williamson, present. Pt demo'd sitting and balance tolerance training at the edge of bed x 8 min; pt able to sit max 20 sec at SBA otherwise pt started leaning to the (R) and backwards the rest of the time, requiring max A. Pt family given handout with UE exercises and reviewed safe hand placement to help with AAROM for 15 reps x1-2 times/day. Brenda Williamson, understood and agreed.       Patient left HOB elevated with all lines intact, call button in reach, bed alarm on, nurse, Sue, notified and daughter and PCT presentEducation:      GOALS:   Multidisciplinary Problems     Occupational Therapy Goals        Problem: Occupational Therapy Goal    Goal Priority Disciplines Outcome Interventions   Occupational Therapy Goal     OT, PT/OT Ongoing (interventions implemented as appropriate)    Description:  Goals to be met by:  05/20/2019    Patient will increase functional independence with ADLs by performing:    Feeding with Moderate Assistance.  Grooming while seated with Moderate Assistance.  Sitting at edge of bed with Moderate Assistance.   Rolling to Bilateral with Moderate Assistance.   Supine to sit with Maximum Assistance.   Squat pivot transfers with Maximum Assistance.     Upper extremity exercise program x10 reps per handout, with assistance as needed.                       Time Tracking:     OT Date of Treatment: 05/07/19  OT Start Time: 1100  OT Stop Time: 1133  OT Total Time (min): 33 min    Billable Minutes:Therapeutic Activity 15 min   Therapeutic Exercise 8 min  Total Time 30 min (co-treat with PT)    Yuridia Cortez OT  5/7/2019

## 2019-05-07 NOTE — PLAN OF CARE
Problem: Fall Injury Risk  Goal: Absence of Fall and Fall-Related Injury  Outcome: Ongoing (interventions implemented as appropriate)  Intervention: Identify and Manage Contributors to Fall Injury Risk     05/07/19 0500   Manage Acute Allergic Reaction   Medication Review/Management medications reviewed   Identify and Manage Contributors to Fall Injury Risk   Self-Care Promotion independence encouraged;BADL personal objects within reach;BADL personal routines maintained         Problem: Adult Inpatient Plan of Care  Goal: Plan of Care Review  Outcome: Ongoing (interventions implemented as appropriate)     05/07/19 0500   Plan of Care Review   Plan of Care Reviewed With patient   Progress improving

## 2019-05-08 PROBLEM — Z51.5 PALLIATIVE CARE ENCOUNTER: Status: ACTIVE | Noted: 2019-01-01

## 2019-05-08 NOTE — CONSULTS
[facility]  Palliative Medicine  Consult Note    Patient Name: Tish Rueda  MRN: [id number]  Admission Date: 5/5/2019  Hospital Length of Stay: 3 days  Code Status: DNR   Attending Provider: Kia Jesus MD  Consulting Provider: Dasha Olson NP  Primary Care Physician: Trista Wills MD  Principal Problem:Elevated troponin    Patient information was obtained from relative(s), past medical records and ER records.        Thank you for your consult. I will follow-up with patient. Please contact us if you have any additional questions.    Subjective:       Principal Problem:Elevated troponin     HPI:  Mrs. Tish Rueda is a 89 y.o. female with essential hypertension, CKD stage 4, anemia of chronic disease, permanent pacemaker in place, GERD, and dementia who presents to [facility] ED with complaints of hypoglycemia today.  Patient's daughter reports that her appetite has been poor for the past two months and that she has become essentially bed-bound over the past three weeks.  She has been sleeping a lot more and in the past two weeks has complained of intermittent chest pain. She would refuse to have EMS activated but today she agree to be taken to the hospital.  She was also more lethargic than usual today.  Upon EMS arrival she was noted to have a capillary blood glucose of 46 mg/dL for which she was given an ampule of 50% dextrose.  Her capillary glucose upon arrival here was 176 mg/dL and her family reports that her mentation is at baseline.  Further history is otherwise limited at this time.     Hospital Course:  Pt admitted with dehydration, ARF and hypoglycemia. Pt oral intake minimal and family at bedside aware. Blood glucose still low and labile. Started on D5 IVF and explained to family that she cannot maintain on IVF for hydration and nutritional support. Suggest palliative care discussion on goals of care/advance care planning. Digoxin level elevated and medication has been  kris Sotomayor trending down.         Hospital Course:  No notes on file        Past Medical History:   Diagnosis Date    CHF (congestive heart failure)     Dr. Rivers    Diverticulosis     GERD (gastroesophageal reflux disease)     Hypertension     Pacemaker     Peptic ulcer disease        Past Surgical History:   Procedure Laterality Date    APPENDECTOMY      CARDIAC PACEMAKER PLACEMENT      CHOLECYSTECTOMY      EYE SURGERY      cataract    HYSTERECTOMY      REPLACEMENT-GENERATOR-PACEMAKER Left 10/7/2014    Performed by Sanju Alcantara MD at [facility] CATH LAB    salpingoopherectomy         Review of patient's allergies indicates:  No Known Allergies    Medications:  Continuous Infusions:   dextrose 5 % and 0.9 % NaCl 125 mL/hr at 05/08/19 0914     Scheduled Meds:   heparin (porcine)  5,000 Units Subcutaneous Q12H    pantoprazole  40 mg Oral Daily     PRN Meds:acetaminophen, cloNIDine, dextrose 50%, dextrose 50%, glucagon (human recombinant), glucose, glucose, ondansetron, pneumoc 13-cindi conj-dip cr(PF), promethazine (PHENERGAN) IVPB, ramelteon, senna-docusate 8.6-50 mg    Family History     None        Tobacco Use    Smoking status: Never Smoker    Smokeless tobacco: Never Used   Substance and Sexual Activity    Alcohol use: No    Drug use: No    Sexual activity: Never       Review of Systems   Unable to perform ROS: Other     Objective:     Vital Signs (Most Recent):  Temp: 98.2 °F (36.8 °C) (05/08/19 0757)  Pulse: 64 (05/08/19 0757)  Resp: 16 (05/08/19 0757)  BP: (!) 114/58 (05/08/19 0757)  SpO2: 100 % (05/08/19 0900) Vital Signs (24h Range):  Temp:  [97.6 °F (36.4 °C)-98.2 °F (36.8 °C)] 98.2 °F (36.8 °C)  Pulse:  [60-64] 64  Resp:  [16-18] 16  SpO2:  [83 %-100 %] 100 %  BP: (114-137)/(58-64) 114/58     Weight: 41.6 kg (91 lb 11.4 oz)  Body mass index is 18.52 kg/m².      Physical Exam   Constitutional:   Thin, frail   Pulmonary/Chest: Effort normal.   Skin: Skin is warm and dry.   Nursing note and  vitals reviewed.      Significant Labs: All pertinent labs within the past 24 hours have been reviewed.  CBC:   Recent Labs   Lab 05/08/19  0450   WBC 8.42   HGB 9.0*   HCT 28.5*   MCV 84   *     BMP:  Recent Labs   Lab 05/08/19  0450   *      K 3.3*   *   CO2 15*   BUN 35*   CREATININE 1.6*   CALCIUM 7.2*   MG 1.5*     LFT:  Lab Results   Component Value Date    AST 11 05/08/2019    ALKPHOS 86 05/08/2019    BILITOT 0.3 05/08/2019     Albumin:   Albumin   Date Value Ref Range Status   05/08/2019 1.7 (L) 3.5 - 5.2 g/dL Final     Protein:   Total Protein   Date Value Ref Range Status   05/08/2019 4.9 (L) 6.0 - 8.4 g/dL Final     Lactic acid:   Lab Results   Component Value Date    LACTATE 2.0 05/06/2019    LACTATE 3.2 (H) 12/29/2015       Significant Imaging: I have reviewed all pertinent imaging results/findings within the past 24 hours.    Advance Care Planning   Advanced Directives::  Living Will: No  LaPOST: No  Do Not Resuscitate Status: Patient is a DNR  Medical Power of : No    Decision-Making Capacity: Family answered questions       Living Arrangements: Lives with family    ASSESSMENT/PLAN:    Palliative encounter:    Bedside consult with patient, daughter and son. Patient is sleeping soundly after a restless night per daughter so I did not awaken. Discussed patient's current clinical condition and daughter reports patient stopped eating over the past few months and has not been able to get up and around much. Reports she use to get up and go to the toilet on her own but unable to do this now. Patient sleeps most of the time now. The biggest concern is that of the patient not eating  And we discussed end of life nutrition and the body's requirements as it starts to decline and both children very receptive to the information.   Daughter states she did drink a few sips of her glucerna this am and I let her know this is fine. We also talked about how blessed her mother has been  to live such a long life. Patient tried to sit on the side of bed with therapy per daughter but really didnt want to and went back to sleep. They want her to take her home and take care of her and I praised them on the wonderful job they have done. Discussed hospice as an option to assist and focusing on her comfort. Patient meets hospice general guidelines with PPS 30%, poor functional status, poor nutritional status and albumin 1.7 along with co- morbids.  They are familiar with hospice and have had several family and friends in hospice. Daughter and son receptive to idea of hospice and feel it would be a good help and will discuss it. Emotional support provided.    -continue current treatment  -recommend hospice (family discussing)  -will need LaPOST at discharge once GOC confirmed  -Palliative will continue to follow      > 50% of 70 min visit spent in chart review, face to face discussion of goals of care,  symptom assessment, coordination of care and emotional support.    Dasha Olson, NP  Palliative Medicine  [facility]

## 2019-05-08 NOTE — NURSING
Received bedside handoff. Patient resting comfortably-denies pain. Family at bedside, very attentive to patient's needs.Call light within reach. Safety measures in place.

## 2019-05-08 NOTE — HPI
Principal Problem:Elevated troponin     HPI:  Mrs. Tish Rueda is a 89 y.o. female with essential hypertension, CKD stage 4, anemia of chronic disease, permanent pacemaker in place, GERD, and dementia who presents to [facility] ED with complaints of hypoglycemia today.  Patient's daughter reports that her appetite has been poor for the past two months and that she has become essentially bed-bound over the past three weeks.  She has been sleeping a lot more and in the past two weeks has complained of intermittent chest pain. She would refuse to have EMS activated but today she agree to be taken to the hospital.  She was also more lethargic than usual today.  Upon EMS arrival she was noted to have a capillary blood glucose of 46 mg/dL for which she was given an ampule of 50% dextrose.  Her capillary glucose upon arrival here was 176 mg/dL and her family reports that her mentation is at baseline.  Further history is otherwise limited at this time.     Hospital Course:  Pt admitted with dehydration, ARF and hypoglycemia. Pt oral intake minimal and family at bedside aware. Blood glucose still low and labile. Started on D5 IVF and explained to family that she cannot maintain on IVF for hydration and nutritional support. Suggest palliative care discussion on goals of care/advance care planning. Digoxin level elevated and medication has been dc'd. Cr trending down.

## 2019-05-08 NOTE — PT/OT/SLP PROGRESS
Physical Therapy      Patient Name:  Tish Rueda   MRN:  [id number]    Patient not seen today secondary to pt fatigue and sleepy.Pt's daughter requested not to get pt up, pt with fear of falling even laying in bed  . Will follow-up as able .    Chrissy Galvan, PTA

## 2019-05-08 NOTE — SUBJECTIVE & OBJECTIVE
Past Medical History:   Diagnosis Date    CHF (congestive heart failure)     Dr. Rivers    Diverticulosis     GERD (gastroesophageal reflux disease)     Hypertension     Pacemaker     Peptic ulcer disease        Past Surgical History:   Procedure Laterality Date    APPENDECTOMY      CARDIAC PACEMAKER PLACEMENT      CHOLECYSTECTOMY      EYE SURGERY      cataract    HYSTERECTOMY      REPLACEMENT-GENERATOR-PACEMAKER Left 10/7/2014    Performed by Sanju Alcantara MD at [facility] CATH LAB    salpingoopherectomy         Review of patient's allergies indicates:  No Known Allergies    Medications:  Continuous Infusions:   dextrose 5 % and 0.9 % NaCl 125 mL/hr at 05/08/19 0914     Scheduled Meds:   heparin (porcine)  5,000 Units Subcutaneous Q12H    pantoprazole  40 mg Oral Daily     PRN Meds:acetaminophen, cloNIDine, dextrose 50%, dextrose 50%, glucagon (human recombinant), glucose, glucose, ondansetron, pneumoc 13-cindi conj-dip cr(PF), promethazine (PHENERGAN) IVPB, ramelteon, senna-docusate 8.6-50 mg    Family History     None        Tobacco Use    Smoking status: Never Smoker    Smokeless tobacco: Never Used   Substance and Sexual Activity    Alcohol use: No    Drug use: No    Sexual activity: Never       Review of Systems   Unable to perform ROS: Other     Objective:     Vital Signs (Most Recent):  Temp: 98.2 °F (36.8 °C) (05/08/19 0757)  Pulse: 64 (05/08/19 0757)  Resp: 16 (05/08/19 0757)  BP: (!) 114/58 (05/08/19 0757)  SpO2: 100 % (05/08/19 0900) Vital Signs (24h Range):  Temp:  [97.6 °F (36.4 °C)-98.2 °F (36.8 °C)] 98.2 °F (36.8 °C)  Pulse:  [60-64] 64  Resp:  [16-18] 16  SpO2:  [83 %-100 %] 100 %  BP: (114-137)/(58-64) 114/58     Weight: 41.6 kg (91 lb 11.4 oz)  Body mass index is 18.52 kg/m².      Physical Exam   Constitutional:   Thin, frail   Pulmonary/Chest: Effort normal.   Skin: Skin is warm and dry.   Nursing note and vitals reviewed.      Significant Labs: All pertinent labs within the  past 24 hours have been reviewed.  CBC:   Recent Labs   Lab 05/08/19  0450   WBC 8.42   HGB 9.0*   HCT 28.5*   MCV 84   *     BMP:  Recent Labs   Lab 05/08/19  0450   *      K 3.3*   *   CO2 15*   BUN 35*   CREATININE 1.6*   CALCIUM 7.2*   MG 1.5*     LFT:  Lab Results   Component Value Date    AST 11 05/08/2019    ALKPHOS 86 05/08/2019    BILITOT 0.3 05/08/2019     Albumin:   Albumin   Date Value Ref Range Status   05/08/2019 1.7 (L) 3.5 - 5.2 g/dL Final     Protein:   Total Protein   Date Value Ref Range Status   05/08/2019 4.9 (L) 6.0 - 8.4 g/dL Final     Lactic acid:   Lab Results   Component Value Date    LACTATE 2.0 05/06/2019    LACTATE 3.2 (H) 12/29/2015       Significant Imaging: I have reviewed all pertinent imaging results/findings within the past 24 hours.    Advance Care Planning   Advanced Directives::  Living Will: No  LaPOST: No  Do Not Resuscitate Status: Patient is a DNR  Medical Power of : No    Decision-Making Capacity: Family answered questions       Living Arrangements: Lives with family    ASSESSMENT/PLAN:    Palliative encounter:    Bedside consult with patient, daughter and son. Patient is sleeping soundly after a restless night per daughter so I did not awaken. Discussed patient's current clinical condition and daughter reports patient stopped eating over the past few months and has not been able to get up and around much. Reports she use to get up and go to the toilet on her own but unable to do this now. Patient sleeps most of the time now. The biggest concern is that of the patient not eating  And we discussed end of life nutrition and the body's requirements as it starts to decline and both children very receptive to the information.   Daughter states she did drink a few sips of her glucerna this am and I let her know this is fine. We also talked about how blessed her mother has been to live such a long life. Patient tried to sit on the side of bed with  therapy per daughter but really didnt want to and went back to sleep. They want her to take her home and take care of her and I praised them on the wonderful job they have done. Discussed hospice as an option to assist and focusing on her comfort. Patient meets hospice general guidelines with PPS 30%, poor functional status, poor nutritional status and albumin 1.7 along with co- morbids.  They are familiar with hospice and have had several family and friends in hospice. Daughter and son receptive to idea of hospice and feel it would be a good help and will discuss it. Emotional support provided.    -continue current treatment  -recommend hospice (family discussing)  -will need LaPOST at discharge once GOC confirmed  -Palliative will continue to follow

## 2019-05-08 NOTE — NURSING
Patient resting in bed quietly. NAD noted. No c/o pain. Fall and safety precautions maintained. Bed alarm activated and audible. Bed locked in the lowest position, with side rails up x 2. Call bell and personal items within reach.Family at bedside.

## 2019-05-08 NOTE — PT/OT/SLP PROGRESS
Occupational Therapy      Patient Name:  Tish Rueda   MRN:  [id number]    Patient not seen today secondary to Patient fatigued. (Pt very fatigued. Dtr, Brenda, present and said she had a restless night and requests for her to rest. ). Will follow-up later as able.    Yuridia Cortez, OT  5/8/2019

## 2019-05-08 NOTE — PLAN OF CARE
Problem: Adult Inpatient Plan of Care  Goal: Absence of Hospital-Acquired Illness or Injury    Intervention: Identify and Manage Fall Risk     05/08/19 0611   Optimize Balance and Safe Activity   Safety Promotion/Fall Prevention assistive device/personal item within reach;bed alarm set;nonskid shoes/socks when out of bed;side rails raised x 3;toileting scheduled     Intervention: Prevent VTE (venous thromboembolism)     05/08/19 0611   Prevent or Manage Embolism   VTE Prevention/Management remove, assess skin and reapply sequential compression device       Goal: Optimal Comfort and Wellbeing    Intervention: Provide Person-Centered Care     05/08/19 0611   Support Dyspnea Relief   Trust Relationship/Rapport care explained;emotional support provided;questions answered;thoughts/feelings acknowledged         Problem: Skin Injury Risk Increased  Goal: Skin Health and Integrity    Intervention: Optimize Skin Protection     05/08/19 0611   Prevent Additional Skin Injury   Head of Bed (HOB) HOB at 20-30 degrees   Pressure Reduction Devices positioning supports utilized;pressure-redistributing mattress utilized   Pressure Reduction Techniques frequent weight shift encouraged   Monitor and Manage Hypervolemia   Skin Protection adhesive use limited;electrode sites changed;pectin skin barriers applied;incontinence pads utilized     Intervention: Promote and Optimize Oral Intake     05/08/19 0611   Monitor and Manage Anemia   Oral Nutrition Promotion calorie dense liquids provided         Problem: Coping Ineffective  Goal: Effective Coping    Intervention: Support and Enhance Coping Strategies     05/08/19 0611   Monitor/Manage Chemotherapy Gastrointestinal Effects   Environmental Support calm environment promoted;environmental consistency promoted;personal routine supported   Promote Anxiety Reduction   Family/Support System Care involvement promoted;support provided   Supportive Measures active listening  utilized;decision-making supported;positive reinforcement provided         Comments: Family very supportive. Patient rested well between care. Stable hours

## 2019-05-09 NOTE — SIGNIFICANT EVENT
Death Pronouncement Note    I was called to Mrs. Tish Rueda's bedside by her nurse to pronounce her death.    My examination revealed absent heart and breath sounds.  There were no carotid and radial pulses.  Her pupils were fixed.  Patient was unresponsive to noxious stimuli.    I pronounced her dead at 2:51 a.m. on Thursday, May 9, 2019.  Family was at the bedside.  I will inform her attending physician.        Total time spent on case: 10 minutes.          Dilshad Angel M.D.  Staff Nocturnist  Department of [facility] Medicine  [facility]  Pager: [phone]

## 2019-05-09 NOTE — NURSING
Noted telemetry AV pacing spikes with failure to capture. Patient apnic and pulseless. Family at bedside. Dr Angel called and pronounced patient at 0251. Patients daughter called other family members. Patients body cleaned and linens changed. Comfort provided for family.

## 2019-05-09 NOTE — SUBJECTIVE & OBJECTIVE
Interval History: no acute cardiac events     Review of Systems   Unable to perform ROS: Dementia     Objective:     Vital Signs (Most Recent):  Temp: 97.9 °F (36.6 °C) (05/08/19 1947)  Pulse: 65 (05/08/19 1947)  Resp: 16 (05/08/19 1947)  BP: (!) 163/72 (05/08/19 1947)  SpO2: (!) 91 % (05/08/19 1947) Vital Signs (24h Range):  Temp:  [97.6 °F (36.4 °C)-98.8 °F (37.1 °C)] 97.9 °F (36.6 °C)  Pulse:  [62-70] 65  Resp:  [16-18] 16  SpO2:  [83 %-100 %] 91 %  BP: (114-163)/(58-72) 163/72     Weight: 41.6 kg (91 lb 11.4 oz)  Body mass index is 18.52 kg/m².    Intake/Output Summary (Last 24 hours) at 5/8/2019 2146  Last data filed at 5/8/2019 1800  Gross per 24 hour   Intake 3894.17 ml   Output --   Net 3894.17 ml      Physical Exam   Constitutional: She appears well-developed. No distress.   Frail, cachectic   HENT:   Head: Normocephalic and atraumatic.   Right Ear: External ear normal.   Left Ear: External ear normal.   Nose: Nose normal.   Eyes: Right eye exhibits no discharge. Left eye exhibits no discharge.   Neck: Normal range of motion.   Cardiovascular: Normal rate, regular rhythm, normal heart sounds and intact distal pulses. Exam reveals no gallop and no friction rub.   No murmur heard.  Pulmonary/Chest: Effort normal and breath sounds normal. No stridor. No respiratory distress. She has no wheezes. She has no rales. She exhibits no tenderness.   Abdominal: Soft. Bowel sounds are normal. She exhibits no distension. There is no tenderness. There is no rebound and no guarding.   Musculoskeletal: Normal range of motion. She exhibits no edema.   Neurological:   Patient is nonverbal   Skin: Skin is warm and dry. She is not diaphoretic. No erythema.   Nursing note and vitals reviewed.      Significant Labs:   BMP:   Recent Labs   Lab 05/08/19  0450   *      K 3.3*   *   CO2 15*   BUN 35*   CREATININE 1.6*   CALCIUM 7.2*   MG 1.5*     CBC:   Recent Labs   Lab 05/07/19  0525 05/08/19  0450   WBC 8.09  8.42   HGB 9.9* 9.0*   HCT 31.2* 28.5*   * 100*     POCT Glucose:   Recent Labs   Lab 05/08/19  1148 05/08/19  1441 05/08/19 2027   POCTGLUCOSE 180* 232* 156*       Significant Imaging: I have reviewed and interpreted all pertinent imaging results/findings within the past 24 hours.

## 2019-05-09 NOTE — NURSING
Bathed and changed patients gown and linens. Family arrived and requested time for family to come in and see patient. Assisted family with packing patients personal items.

## 2019-05-09 NOTE — NURSING
Pacemaker turned off with magnet. Patients body prepared for [facility]

## 2019-05-09 NOTE — NURSING
Dr. Nicole corona patient was ordered K+40 meq po. Pt is not alert enough to swallow it. Order given to d/c K+ patient's level is 3.3

## 2019-05-09 NOTE — PHYSICIAN QUERY
PT Name: Tish Rueda  MR #: [id number]     PHYSICIAN QUERY -  ELECTROLYTE CLARIFICATION      CDS/: Alesia Arriaza RN              Contact information:[phone]  This form is a permanent document in the medical record.     Query Date: May 9, 2019    By submitting this query, we are merely seeking further clarification of documentation to reflect the severity of illness of your patient. Please utilize your independent clinical judgment when addressing the question(s) below.    The Medical record reflects the following:     Indicators   Supporting Clinical Findings Location in Medical Record   x Lab Value(s) Magnesium 1.9-> 1.7-> 1.5   Lab 5/6, 5/7, 5/8   x Treatment                                 Medication Magnesium sulfate IV  MAR 5/8    Other       Provider, please specify the diagnosis or diagnoses that correspond(s) to the above indicators. Jai all that apply:    [  x ] Hypomagnesemia     [   ] Other electrolyte disturbance (please specify): _______     [   ]  Clinically Undetermined       Please document in your progress notes daily for the duration of treatment until resolved, and include in your discharge summary.

## 2019-05-09 NOTE — NURSING
Repositioned patient and pericare completed. Patient resting comfortably-smiling when addressed. Family remains at bedside.

## 2019-05-09 NOTE — NURSING
[place] [facility] post[place] care completed.

## 2019-05-09 NOTE — PHYSICIAN QUERY
"PT Name: Tish Rueda  MR #: [id number]    Physician Query Form - Nutrition Clarification     CDS/: Alesia Arriaza RN             Contact information:  [phone]    This form is a permanent document in the medical record.     Query Date: May 9, 2019    By submitting this query, we are merely seeking further clarification of documentation.. Please utilize your independent clinical judgment when addressing the question(s) below.    The Medical record contains the following:   Indicators  Supporting Clinical Findings Location in Medical Record   x % of Estimated Energy Intake over a time frame from p.o., TF, or TPN  Nutrition Problem    Inadequate energy intake        Related to (etiology):    Decreased appetite/lethargy        Signs and Symptoms (as evidenced by):    <85% of EEN/EPN being met currently & pta        Energy Intake (Malnutrition): less than 75% for greater than 7 days      Consult 5/6       Nutrition                    Consult 5/6       Nutrition   x Weight Status over a time frame Pt has had some wt loss pta but daughter was unable to quantify it. No recent wts in Epic noted. NFPE performed today; pt w/ some muscle wasting/fat loss which is expected in a 89 yo pt   Consult 5/6       Nutrition   x Subcutaneous Fat and/or Muscle Loss Subcutaneous Fat Loss (Final Summary): (Pt w/ fat loss that is expected in a 89 yo pt)    Muscle Loss Evaluation (Final Summary): (pt w/ muscle loss that is expected in a 89 yo pt)     Consult 5/6       Nutrition    Fluid Accumulation or Edema      Reduced  Strength     x Wt / BMI / Usual Body Weight    Height: 4' 11" (149.9 cm)      Weight (lb): 88.85 lb     BMI (Calculated): 18      BMI Grade: 17 - 18.4 protein-energy malnutrition grade I    Consult 5/6       Nutrition    Delayed Wound Healing / Failure to Thrive     x Acute or Chronic Illness Digoxin toxicity   Acute on CKD stage 4   Elevated troponin    Essential hypertension, benign    Anemia of chronic " disorder   GERD (gastroesophageal reflux disease)        Pt admitted with dehydration, ARF and hypoglycemia.         H&P 5/6                Progress Note 5/7       Hospital Medicine    Medication     x Treatment Recommendations:   1. Encourage adequate intake of meals/oral nutr supplements     2. Consider ST consult to eval swallow function    Consult 5/6       Nutrition   x Other Frail, cachectic         Discussed hospice as an option to assist and focusing on her comfort. Patient meets hospice general guidelines with PPS 30%, poor functional status, poor nutritional status and albumin 1.7 along  with co- morbids.      Progress Note 5/8      Hospital Medicine      Consult 5/8      Palliative Medicine       AND / ASPEN Clinical Characteristics (October 2011)  A minimum of two characteristics is recommended for diagnosing either moderate or severe malnutrition   Mild Malnutrition Moderate Malnutrition Severe Malnutrition   Energy Intake from p.o., TF or TPN. < 75% intake of estimated energy needs for less than 7 days < 75% intake of estimated energy needs for greater than 7 days < 50% intake of estimated energy needs for > 5 days   Weight Loss 1-2% in 1 month  5% in 3 months  7.5% in 6 months  10% in 1 year 1-2 % in 1 week  5% in 1 month  7.5% in 3 months  10% in 6 months  20% in 1 year > 2% in 1 week  > 5% in 1 month  > 7.5% in 3 months  > 10% in 6 months  > 20% in 1 year   Physical Findings     None *Mild subcutaneous fat and/or muscle loss  *Mild fluid accumulation  *Stage II decubitus  *Surgical wound or non-healing wound *Mod/severe subcutaneous fat and/or muscle loss  *Mod/severe fluid accumulation  *Stage III or IV decubitus  *Non-healing surgical wound     Provider, please specify diagnosis or diagnoses associated with above clinical findings.    [  ] Mild Protein-Calorie Malnutrition   [  ] Cachexia   [  x] Underweight   [  ] Other Nutritional Diagnosis (please specify):    [  ] Other:    [  ] Clinically  Undetermined       Please document in your progress notes daily for the duration of treatment until resolved and include in your discharge summary.

## 2019-05-09 NOTE — NURSING
Received bedside handoff. Patient smiles when spoken to. Appears weaker tonight but in no distress. Family at bedside assisting with care. Family states ready for patient to return home- hospice being set up. Call light within reach but family staying with patient overnight to call staff if patient needs assistance prior to frequent rounding.

## 2019-05-09 NOTE — PROGRESS NOTES
[facility] Hospital Medicine  Progress Note    Patient Name: Tish Rueda  MRN: [id number]  Patient Class: IP- Inpatient   Admission Date: 5/5/2019  Length of Stay: 3 days  Attending Physician: Kia Jesus MD  Primary Care Provider: Trista Wills MD        Subjective:     Principal Problem:Elevated troponin    HPI:  Mrs. Tish Rueda is a 89 y.o. female with essential hypertension, CKD stage 4, anemia of chronic disease, permanent pacemaker in place, GERD, and dementia who presents to [facility] ED with complaints of hypoglycemia today.  Patient's daughter reports that her appetite has been poor for the past two months and that she has become essentially bed-bound over the past three weeks.  She has been sleeping a lot more and in the past two weeks has complained of intermittent chest pain. She would refuse to have EMS activated but today she agree to be taken to the hospital.  She was also more lethargic than usual today.  Upon EMS arrival she was noted to have a capillary blood glucose of 46 mg/dL for which she was given an ampule of 50% dextrose.  Her capillary glucose upon arrival here was 176 mg/dL and her family reports that her mentation is at baseline.  Further history is otherwise limited at this time.    Hospital Course:  Pt admitted with dehydration, ARF and hypoglycemia. Pt oral intake minimal and family at bedside aware. Blood glucose still low and labile. Started on D5 IVF and explained to family that she cannot maintain on IVF for hydration and nutritional support. Suggest palliative care discussion on goals of care/advance care planning. Digoxin level elevated and medication has been dc'd. Cr trending down.     Palliative care met with patient and family and they will consider hospice.  Cr trending down and IVF rate lowered. BS supported with IVF and this concern discussed with family at bedside. PLan for dc tomorrow +/- hospice vs HH.     Interval History: no acute  cardiac events     Review of Systems   Unable to perform ROS: Dementia     Objective:     Vital Signs (Most Recent):  Temp: 97.9 °F (36.6 °C) (05/08/19 1947)  Pulse: 65 (05/08/19 1947)  Resp: 16 (05/08/19 1947)  BP: (!) 163/72 (05/08/19 1947)  SpO2: (!) 91 % (05/08/19 1947) Vital Signs (24h Range):  Temp:  [97.6 °F (36.4 °C)-98.8 °F (37.1 °C)] 97.9 °F (36.6 °C)  Pulse:  [62-70] 65  Resp:  [16-18] 16  SpO2:  [83 %-100 %] 91 %  BP: (114-163)/(58-72) 163/72     Weight: 41.6 kg (91 lb 11.4 oz)  Body mass index is 18.52 kg/m².    Intake/Output Summary (Last 24 hours) at 5/8/2019 2146  Last data filed at 5/8/2019 1800  Gross per 24 hour   Intake 3894.17 ml   Output --   Net 3894.17 ml      Physical Exam   Constitutional: She appears well-developed. No distress.   Frail, cachectic   HENT:   Head: Normocephalic and atraumatic.   Right Ear: External ear normal.   Left Ear: External ear normal.   Nose: Nose normal.   Eyes: Right eye exhibits no discharge. Left eye exhibits no discharge.   Neck: Normal range of motion.   Cardiovascular: Normal rate, regular rhythm, normal heart sounds and intact distal pulses. Exam reveals no gallop and no friction rub.   No murmur heard.  Pulmonary/Chest: Effort normal and breath sounds normal. No stridor. No respiratory distress. She has no wheezes. She has no rales. She exhibits no tenderness.   Abdominal: Soft. Bowel sounds are normal. She exhibits no distension. There is no tenderness. There is no rebound and no guarding.   Musculoskeletal: Normal range of motion. She exhibits no edema.   Neurological:   Patient is nonverbal   Skin: Skin is warm and dry. She is not diaphoretic. No erythema.   Nursing note and vitals reviewed.      Significant Labs:   BMP:   Recent Labs   Lab 05/08/19  0450   *      K 3.3*   *   CO2 15*   BUN 35*   CREATININE 1.6*   CALCIUM 7.2*   MG 1.5*     CBC:   Recent Labs   Lab 05/07/19  0525 05/08/19  0450   WBC 8.09 8.42   HGB 9.9* 9.0*   HCT  31.2* 28.5*   * 100*     POCT Glucose:   Recent Labs   Lab 05/08/19  1148 05/08/19  1441 05/08/19 2027   POCTGLUCOSE 180* 232* 156*       Significant Imaging: I have reviewed and interpreted all pertinent imaging results/findings within the past 24 hours.    Assessment/Plan:      * Elevated troponin  Patient has a moderately increased troponin 0.498 with a two-hour repeat of 0.428.  She has never had previously elevated troponins.  She did complain of chest pains in the last two weeks.  I personally reviewed her EKG and it was significant for a wide QRS-complex rhythm.  Unclear if this actually represents an acute myocardial infarction.  Given her advanced age, acute renal failure, and other significant chronic comorbidities, I doubt she would be an ideal candidate for intervention at this time.  Will consult Cardiology for further recommendations.    Palliative care encounter  Advance care planning > 20 minutes  Family considering hospice  DNR - need lapost       Acute on CKD stage 4  Patient's urinalysis is significant for a specific gravity of 1.010 but otherwise benign.  Urine output has been fair.  Will obtain additional urine studies; provide aggressive IV fluid hydration; monitor the urine output; recheck the renal function in the morning; and avoid nephrotoxins.  Etiology is likely poor oral intake.    Cr trending down, continue IVF    Digoxin toxicity  Her digoxin level was noted to be > 4.0 in the setting of worsening renal failure; her potassium is normal.  Poison Control was contacted and recommended serial digoxin levels along with supportive therapy with IV fluids and cardiac monitoring.  It is unclear why the patient is on digoxin in the first place?  She does not have a history of atrial fibrillation but her history did report congestive heart failure for which she is supposedly following with Dr. Uriel Rivers.  Will repeat her TTE in the morning and consult Cardiology for further  recommendations.    Digoxin dc'd  Pacer evaluated     Anemia of chronic disorder  The patient's H/H is stable and consistent with previous laboratory measurements, and the patient exhibits no signs or symptoms of acute bleeding; there is no indication for transfusion.  Will continue to monitor.    Essential hypertension, benign  Her blood pressure has been borderline low; will hold her home regimen of furosemide.    CKD stage 4  As addressed above.    GERD (gastroesophageal reflux disease)  Will continue home regimen of pantoprazole.    Permanent pacemaker in place  Stable; there are no acute issues.      VTE Risk Mitigation (From admission, onward)        Ordered     heparin (porcine) injection 5,000 Units  Every 12 hours      05/06/19 0249     IP VTE HIGH RISK PATIENT  Once      05/06/19 0249     Place sequential compression device  Until discontinued      05/06/19 0245     Place LEONILA hose  Until discontinued      05/06/19 0245              Kia Jesus MD  Department of Hospital Medicine   [facility]

## 2019-05-11 LAB
BACTERIA BLD CULT: NORMAL
BACTERIA BLD CULT: NORMAL
